# Patient Record
Sex: MALE | Race: WHITE | NOT HISPANIC OR LATINO | URBAN - METROPOLITAN AREA
[De-identification: names, ages, dates, MRNs, and addresses within clinical notes are randomized per-mention and may not be internally consistent; named-entity substitution may affect disease eponyms.]

---

## 2017-09-03 ENCOUNTER — INPATIENT (INPATIENT)
Facility: HOSPITAL | Age: 57
LOS: 3 days | DRG: 189 | End: 2017-09-07
Attending: GENERAL ACUTE CARE HOSPITAL | Admitting: STUDENT IN AN ORGANIZED HEALTH CARE EDUCATION/TRAINING PROGRAM
Payer: COMMERCIAL

## 2017-09-03 VITALS
DIASTOLIC BLOOD PRESSURE: 85 MMHG | TEMPERATURE: 98 F | RESPIRATION RATE: 26 BRPM | OXYGEN SATURATION: 88 % | HEART RATE: 126 BPM | SYSTOLIC BLOOD PRESSURE: 115 MMHG

## 2017-09-03 DIAGNOSIS — C80.1 MALIGNANT (PRIMARY) NEOPLASM, UNSPECIFIED: ICD-10-CM

## 2017-09-03 DIAGNOSIS — Z98.890 OTHER SPECIFIED POSTPROCEDURAL STATES: Chronic | ICD-10-CM

## 2017-09-03 DIAGNOSIS — Z90.49 ACQUIRED ABSENCE OF OTHER SPECIFIED PARTS OF DIGESTIVE TRACT: Chronic | ICD-10-CM

## 2017-09-03 LAB
APTT BLD: 29.6 SEC — SIGNIFICANT CHANGE UP (ref 27.5–37.4)
BASOPHILS # BLD AUTO: 0.1 K/UL — SIGNIFICANT CHANGE UP (ref 0–0.2)
BASOPHILS NFR BLD AUTO: 0.5 % — SIGNIFICANT CHANGE UP (ref 0–2)
CK MB BLD-MCNC: 2 % — SIGNIFICANT CHANGE UP (ref 0–3.5)
CK MB CFR SERPL CALC: 3 NG/ML — SIGNIFICANT CHANGE UP (ref 0–6.7)
CK SERPL-CCNC: 150 U/L — SIGNIFICANT CHANGE UP (ref 30–200)
EOSINOPHIL # BLD AUTO: 0 K/UL — SIGNIFICANT CHANGE UP (ref 0–0.5)
EOSINOPHIL NFR BLD AUTO: 0.4 % — SIGNIFICANT CHANGE UP (ref 0–6)
GAS PNL BLDV: SIGNIFICANT CHANGE UP
HCT VFR BLD CALC: 37.9 % — LOW (ref 39–50)
HGB BLD-MCNC: 12.6 G/DL — LOW (ref 13–17)
INR BLD: 1.22 RATIO — HIGH (ref 0.88–1.16)
LYMPHOCYTES # BLD AUTO: 1.4 K/UL — SIGNIFICANT CHANGE UP (ref 1–3.3)
LYMPHOCYTES # BLD AUTO: 11.6 % — LOW (ref 13–44)
MCHC RBC-ENTMCNC: 33.2 GM/DL — SIGNIFICANT CHANGE UP (ref 32–36)
MCHC RBC-ENTMCNC: 33.7 PG — SIGNIFICANT CHANGE UP (ref 27–34)
MCV RBC AUTO: 101 FL — HIGH (ref 80–100)
MONOCYTES # BLD AUTO: 0.6 K/UL — SIGNIFICANT CHANGE UP (ref 0–0.9)
MONOCYTES NFR BLD AUTO: 5 % — SIGNIFICANT CHANGE UP (ref 2–14)
NEUTROPHILS # BLD AUTO: 10.3 K/UL — HIGH (ref 1.8–7.4)
NEUTROPHILS NFR BLD AUTO: 82.5 % — HIGH (ref 43–77)
NT-PROBNP SERPL-SCNC: 4160 PG/ML — HIGH (ref 0–300)
PLATELET # BLD AUTO: 59 K/UL — LOW (ref 150–400)
PROTHROM AB SERPL-ACNC: 13.3 SEC — HIGH (ref 9.8–12.7)
RAPID RVP RESULT: SIGNIFICANT CHANGE UP
RBC # BLD: 3.75 M/UL — LOW (ref 4.2–5.8)
RBC # FLD: 17.1 % — HIGH (ref 10.3–14.5)
TROPONIN T SERPL-MCNC: 0.01 NG/ML — SIGNIFICANT CHANGE UP (ref 0–0.06)
WBC # BLD: 12.4 K/UL — HIGH (ref 3.8–10.5)
WBC # FLD AUTO: 12.4 K/UL — HIGH (ref 3.8–10.5)

## 2017-09-03 PROCEDURE — 99291 CRITICAL CARE FIRST HOUR: CPT | Mod: 25

## 2017-09-03 PROCEDURE — 71275 CT ANGIOGRAPHY CHEST: CPT | Mod: 26

## 2017-09-03 PROCEDURE — 93010 ELECTROCARDIOGRAM REPORT: CPT

## 2017-09-03 PROCEDURE — 71010: CPT | Mod: 26

## 2017-09-03 RX ORDER — IPRATROPIUM/ALBUTEROL SULFATE 18-103MCG
3 AEROSOL WITH ADAPTER (GRAM) INHALATION ONCE
Qty: 0 | Refills: 0 | Status: COMPLETED | OUTPATIENT
Start: 2017-09-03 | End: 2017-09-03

## 2017-09-03 RX ADMIN — Medication 3 MILLILITER(S): at 21:41

## 2017-09-03 NOTE — ED PROVIDER NOTE - ATTENDING CONTRIBUTION TO CARE
Pt with worsening dyspnea, low sats, h/o PE on Lovenox.  Exam: bilateral bs, good aeration on bipap.

## 2017-09-03 NOTE — ED PROVIDER NOTE - NS ED ROS FT
Padmaja Cotton, DO: +weakness, constipation ROS: denies chest pain, abdominal pain, diarrhea, joint pain, neuro deficits, dysuria/hematuria, rash.

## 2017-09-03 NOTE — ED PROVIDER NOTE - PROGRESS NOTE DETAILS
Padmaja Cotton, DO: Pt less tachypenic & symptomatically improved on BIPAP. Coloring improved although still belly breathing. Will remain on BIPAP at this time.

## 2017-09-03 NOTE — ED PROVIDER NOTE - CRITICAL CARE PROVIDED
direct patient care (not related to procedure)/documentation/consult w/ pt's family directly relating to pts condition/interpretation of diagnostic studies/additional history taking

## 2017-09-03 NOTE — ED ADULT NURSE NOTE - OBJECTIVE STATEMENT
58 yo male with PMH advanced lung cancer, PE brought by EMS with difficulty breathing. Patient states that he has been short of breath x1 week, symptoms worsened acutely today. Upon arrival to ED, patient O2 sat 84% on 6L NC, patient tachycardic and tachypneic. Symptoms improved with nonrebreather mask 15L. Abdomen is mildly distended, bruised 2/2 lovenox injections. +Bilateral lower extremity edema. Family at bedside.

## 2017-09-03 NOTE — ED PROVIDER NOTE - OBJECTIVE STATEMENT
Padmaja Cotton, DO: 57M with hx of stage IV lung CA on immunotherapy (Nic). Pt diagnosed Nov 1, 2016, previously on chemo. Hx of PE with failed therapy anticoagulants, now on Lovenox 120 mg BID.. Pt with SOB x 1 week, progressively worsening. Pt saw your Hem/Onc on Tuesday who noted b/l LE edema likely 2/2 4 mg TID. Per wife, Pt had fever 100.2 yesterday & has had hemoptysis but has had epistaxis.     PMD: Jeanmarie Shaffer ([facility])  Hem/Onc: Dr. Brien Avelar ([facility])  Pulm: Marcio Scott ([facility]) Padmaja Cotton, DO: 57M with hx of stage IV lung CA on immunotherapy (Nic). Pt diagnosed Nov 1, 2016, previously on chemo. Hx of PE with failed therapy anticoagulants, now on Lovenox 120 mg BID.. Pt with SOB x 1 week, progressively worsening. Pt saw your Hem/Onc on Tuesday who noted b/l LE edema likely 2/2 4 mg TID. Per wife, Pt had fever 100.2 yesterday & has had hemoptysis but has had epistaxis. Abdominal ecchymosis 2/2 lovenox with no recent trauma.     PMD: Jeanmarie Shaffer ([facility])  Hem/Onc: Dr. Brien Avelar ([facility])  Pulm: Marcio Scott ([facility])

## 2017-09-03 NOTE — ED PROVIDER NOTE - PHYSICAL EXAMINATION
Padmaja Cotton, DO:  Gen: tachypneic, hypoxic,Well appearing, NAD  Head: NCAT  HEENT: PERRL, MMM, normal conjunctiva, anicteric, neck supple  Lung: CTAB, no adventitious sounds  CV: RRR, no murmurs, rubs or gallops  Abd: soft, NTND, no rebound or guarding, no CVAT  MSK: No edema, no visible deformities  Neuro: No focal neurologic deficits. CN II-XII grossly intact. 5/5 strength and normal sensation in all extremities.  Skin: Warm and dry, no evidence of rash  Psych: normal mood and affect Padmaja Cotton, DO:  Gen: tachypneic, hypoxic to 76% on RA, accessory muscle use & belly breathing   Head: NCAT  HEENT: PERRL, MMM, normal conjunctiva, anicteric, neck supple, dried blood on b/l nasal mucosa, airway patent  Lung: CTAB, no adventitious sounds, no stridor  CV: tachycardic with regular rate, no murmurs, rubs or gallops  Abd: soft, obese NTND, no rebound or guarding, no CVAT, flank ecchymosis   MSK: No edema, no visible deformities  Neuro: No focal neurologic deficits. CN II-XII grossly intact. 5/5 strength and normal sensation in all extremities.  Skin: Warm and dry, no evidence of rash  Psych: normal mood and affect

## 2017-09-04 DIAGNOSIS — D69.6 THROMBOCYTOPENIA, UNSPECIFIED: ICD-10-CM

## 2017-09-04 DIAGNOSIS — I27.82 CHRONIC PULMONARY EMBOLISM: ICD-10-CM

## 2017-09-04 DIAGNOSIS — C34.90 MALIGNANT NEOPLASM OF UNSPECIFIED PART OF UNSPECIFIED BRONCHUS OR LUNG: ICD-10-CM

## 2017-09-04 DIAGNOSIS — J96.01 ACUTE RESPIRATORY FAILURE WITH HYPOXIA: ICD-10-CM

## 2017-09-04 LAB
ANION GAP SERPL CALC-SCNC: 18 MMOL/L — HIGH (ref 5–17)
ANISOCYTOSIS BLD QL: SLIGHT — SIGNIFICANT CHANGE UP
APPEARANCE UR: CLEAR — SIGNIFICANT CHANGE UP
APTT BLD: 29.1 SEC — SIGNIFICANT CHANGE UP (ref 27.5–37.4)
BACTERIA # UR AUTO: NEGATIVE — SIGNIFICANT CHANGE UP
BASOPHILS # BLD AUTO: 0.08 K/UL — SIGNIFICANT CHANGE UP (ref 0–0.2)
BASOPHILS NFR BLD AUTO: 1 % — SIGNIFICANT CHANGE UP (ref 0–2)
BILIRUB UR-MCNC: NEGATIVE — SIGNIFICANT CHANGE UP
BUN SERPL-MCNC: 39 MG/DL — HIGH (ref 7–23)
CALCIUM SERPL-MCNC: 7.9 MG/DL — LOW (ref 8.4–10.5)
CHLORIDE SERPL-SCNC: 105 MMOL/L — SIGNIFICANT CHANGE UP (ref 96–108)
CK MB BLD-MCNC: 2.1 % — SIGNIFICANT CHANGE UP (ref 0–3.5)
CK MB BLD-MCNC: 2.1 % — SIGNIFICANT CHANGE UP (ref 0–3.5)
CK MB CFR SERPL CALC: 2.6 NG/ML — SIGNIFICANT CHANGE UP (ref 0–6.7)
CK MB CFR SERPL CALC: 2.9 NG/ML — SIGNIFICANT CHANGE UP (ref 0–6.7)
CK SERPL-CCNC: 122 U/L — SIGNIFICANT CHANGE UP (ref 30–200)
CK SERPL-CCNC: 136 U/L — SIGNIFICANT CHANGE UP (ref 30–200)
CO2 SERPL-SCNC: 18 MMOL/L — LOW (ref 22–31)
COLOR SPEC: YELLOW — SIGNIFICANT CHANGE UP
CREAT SERPL-MCNC: 1.15 MG/DL — SIGNIFICANT CHANGE UP (ref 0.5–1.3)
DIFF PNL FLD: ABNORMAL
EOSINOPHIL # BLD AUTO: 0.08 K/UL — SIGNIFICANT CHANGE UP (ref 0–0.5)
EOSINOPHIL NFR BLD AUTO: 1 % — SIGNIFICANT CHANGE UP (ref 0–6)
EPI CELLS # UR: 3 /HPF — SIGNIFICANT CHANGE UP (ref 0–5)
GAS PNL BLDV: SIGNIFICANT CHANGE UP
GLUCOSE SERPL-MCNC: 98 MG/DL — SIGNIFICANT CHANGE UP (ref 70–99)
GLUCOSE UR QL: NEGATIVE — SIGNIFICANT CHANGE UP
HCT VFR BLD CALC: 33.6 % — LOW (ref 39–50)
HCT VFR BLD CALC: 35.7 % — LOW (ref 39–50)
HGB BLD-MCNC: 10.9 G/DL — LOW (ref 13–17)
HGB BLD-MCNC: 11.8 G/DL — LOW (ref 13–17)
HYALINE CASTS # UR AUTO: 8 /LPF — HIGH (ref 0–7)
INR BLD: 1.19 RATIO — HIGH (ref 0.88–1.16)
KETONES UR-MCNC: NEGATIVE — SIGNIFICANT CHANGE UP
LACTATE SERPL-SCNC: 1.5 MMOL/L — SIGNIFICANT CHANGE UP (ref 0.7–2)
LEUKOCYTE ESTERASE UR-ACNC: NEGATIVE — SIGNIFICANT CHANGE UP
LYMPHOCYTES # BLD AUTO: 1.4 K/UL — SIGNIFICANT CHANGE UP (ref 1–3.3)
LYMPHOCYTES # BLD AUTO: 17 % — SIGNIFICANT CHANGE UP (ref 13–44)
MANUAL SMEAR VERIFICATION: SIGNIFICANT CHANGE UP
MCHC RBC-ENTMCNC: 32.2 PG — SIGNIFICANT CHANGE UP (ref 27–34)
MCHC RBC-ENTMCNC: 32.4 GM/DL — SIGNIFICANT CHANGE UP (ref 32–36)
MCHC RBC-ENTMCNC: 33 GM/DL — SIGNIFICANT CHANGE UP (ref 32–36)
MCHC RBC-ENTMCNC: 33.6 PG — SIGNIFICANT CHANGE UP (ref 27–34)
MCV RBC AUTO: 102 FL — HIGH (ref 80–100)
MCV RBC AUTO: 99.4 FL — SIGNIFICANT CHANGE UP (ref 80–100)
METAMYELOCYTES # FLD: 4 % — HIGH (ref 0–0)
MONOCYTES # BLD AUTO: 0.25 K/UL — SIGNIFICANT CHANGE UP (ref 0–0.9)
MONOCYTES NFR BLD AUTO: 3 % — SIGNIFICANT CHANGE UP (ref 2–14)
MYELOCYTES NFR BLD: 2 % — HIGH (ref 0–0)
NEUTROPHILS # BLD AUTO: 5.93 K/UL — SIGNIFICANT CHANGE UP (ref 1.8–7.4)
NEUTROPHILS NFR BLD AUTO: 69 % — SIGNIFICANT CHANGE UP (ref 43–77)
NEUTS BAND # BLD: 3 % — SIGNIFICANT CHANGE UP (ref 0–8)
NITRITE UR-MCNC: NEGATIVE — SIGNIFICANT CHANGE UP
NRBC # BLD: 2 /100 — HIGH (ref 0–0)
PH UR: 6 — SIGNIFICANT CHANGE UP (ref 5–8)
PLAT MORPH BLD: NORMAL — SIGNIFICANT CHANGE UP
PLATELET # BLD AUTO: 46 K/UL — LOW (ref 150–400)
PLATELET # BLD AUTO: 56 K/UL — LOW (ref 150–400)
POTASSIUM SERPL-MCNC: 4.8 MMOL/L — SIGNIFICANT CHANGE UP (ref 3.5–5.3)
POTASSIUM SERPL-SCNC: 4.8 MMOL/L — SIGNIFICANT CHANGE UP (ref 3.5–5.3)
PROT UR-MCNC: 100
PROTHROM AB SERPL-ACNC: 13.5 SEC — HIGH (ref 10–13.1)
RBC # BLD: 3.38 M/UL — LOW (ref 4.2–5.8)
RBC # BLD: 3.5 M/UL — LOW (ref 4.2–5.8)
RBC # FLD: 17 % — HIGH (ref 10.3–14.5)
RBC # FLD: 18.8 % — HIGH (ref 10.3–14.5)
RBC BLD AUTO: SIGNIFICANT CHANGE UP
RBC CASTS # UR COMP ASSIST: 4 /HPF — SIGNIFICANT CHANGE UP (ref 0–4)
SODIUM SERPL-SCNC: 141 MMOL/L — SIGNIFICANT CHANGE UP (ref 135–145)
SP GR SPEC: =1.051 — SIGNIFICANT CHANGE UP (ref 1.01–1.02)
TROPONIN T SERPL-MCNC: <0.01 NG/ML — SIGNIFICANT CHANGE UP (ref 0–0.06)
TROPONIN T SERPL-MCNC: <0.01 NG/ML — SIGNIFICANT CHANGE UP (ref 0–0.06)
UROBILINOGEN FLD QL: NEGATIVE — SIGNIFICANT CHANGE UP
WBC # BLD: 10.1 K/UL — SIGNIFICANT CHANGE UP (ref 3.8–10.5)
WBC # BLD: 8.24 K/UL — SIGNIFICANT CHANGE UP (ref 3.8–10.5)
WBC # FLD AUTO: 10.1 K/UL — SIGNIFICANT CHANGE UP (ref 3.8–10.5)
WBC # FLD AUTO: 8.24 K/UL — SIGNIFICANT CHANGE UP (ref 3.8–10.5)
WBC UR QL: 5 /HPF — SIGNIFICANT CHANGE UP (ref 0–5)

## 2017-09-04 PROCEDURE — 99223 1ST HOSP IP/OBS HIGH 75: CPT

## 2017-09-04 RX ORDER — GLUCAGON INJECTION, SOLUTION 0.5 MG/.1ML
1 INJECTION, SOLUTION SUBCUTANEOUS ONCE
Qty: 0 | Refills: 0 | Status: DISCONTINUED | OUTPATIENT
Start: 2017-09-04 | End: 2017-09-07

## 2017-09-04 RX ORDER — VANCOMYCIN HCL 1 G
VIAL (EA) INTRAVENOUS
Qty: 0 | Refills: 0 | Status: DISCONTINUED | OUTPATIENT
Start: 2017-09-04 | End: 2017-09-05

## 2017-09-04 RX ORDER — DEXTROSE 50 % IN WATER 50 %
25 SYRINGE (ML) INTRAVENOUS ONCE
Qty: 0 | Refills: 0 | Status: DISCONTINUED | OUTPATIENT
Start: 2017-09-04 | End: 2017-09-07

## 2017-09-04 RX ORDER — PIPERACILLIN AND TAZOBACTAM 4; .5 G/20ML; G/20ML
3.38 INJECTION, POWDER, LYOPHILIZED, FOR SOLUTION INTRAVENOUS ONCE
Qty: 0 | Refills: 0 | Status: COMPLETED | OUTPATIENT
Start: 2017-09-04 | End: 2017-09-04

## 2017-09-04 RX ORDER — VANCOMYCIN HCL 1 G
1000 VIAL (EA) INTRAVENOUS ONCE
Qty: 0 | Refills: 0 | Status: COMPLETED | OUTPATIENT
Start: 2017-09-04 | End: 2017-09-04

## 2017-09-04 RX ORDER — VANCOMYCIN HCL 1 G
1000 VIAL (EA) INTRAVENOUS EVERY 12 HOURS
Qty: 0 | Refills: 0 | Status: DISCONTINUED | OUTPATIENT
Start: 2017-09-04 | End: 2017-09-05

## 2017-09-04 RX ORDER — ENOXAPARIN SODIUM 100 MG/ML
120 INJECTION SUBCUTANEOUS EVERY 12 HOURS
Qty: 0 | Refills: 0 | Status: DISCONTINUED | OUTPATIENT
Start: 2017-09-04 | End: 2017-09-07

## 2017-09-04 RX ORDER — DEXTROSE 50 % IN WATER 50 %
1 SYRINGE (ML) INTRAVENOUS ONCE
Qty: 0 | Refills: 0 | Status: DISCONTINUED | OUTPATIENT
Start: 2017-09-04 | End: 2017-09-07

## 2017-09-04 RX ORDER — FUROSEMIDE 40 MG
20 TABLET ORAL ONCE
Qty: 0 | Refills: 0 | Status: COMPLETED | OUTPATIENT
Start: 2017-09-04 | End: 2017-09-04

## 2017-09-04 RX ORDER — SODIUM CHLORIDE 9 MG/ML
1000 INJECTION, SOLUTION INTRAVENOUS
Qty: 0 | Refills: 0 | Status: DISCONTINUED | OUTPATIENT
Start: 2017-09-04 | End: 2017-09-07

## 2017-09-04 RX ORDER — DEXTROSE 50 % IN WATER 50 %
12.5 SYRINGE (ML) INTRAVENOUS ONCE
Qty: 0 | Refills: 0 | Status: DISCONTINUED | OUTPATIENT
Start: 2017-09-04 | End: 2017-09-07

## 2017-09-04 RX ORDER — SODIUM CHLORIDE 9 MG/ML
1000 INJECTION INTRAMUSCULAR; INTRAVENOUS; SUBCUTANEOUS
Qty: 0 | Refills: 0 | Status: DISCONTINUED | OUTPATIENT
Start: 2017-09-04 | End: 2017-09-04

## 2017-09-04 RX ORDER — AZITHROMYCIN 500 MG/1
500 TABLET, FILM COATED ORAL ONCE
Qty: 0 | Refills: 0 | Status: DISCONTINUED | OUTPATIENT
Start: 2017-09-04 | End: 2017-09-04

## 2017-09-04 RX ORDER — OXYCODONE AND ACETAMINOPHEN 5; 325 MG/1; MG/1
2 TABLET ORAL EVERY 4 HOURS
Qty: 0 | Refills: 0 | Status: DISCONTINUED | OUTPATIENT
Start: 2017-09-04 | End: 2017-09-07

## 2017-09-04 RX ORDER — PIPERACILLIN AND TAZOBACTAM 4; .5 G/20ML; G/20ML
3.38 INJECTION, POWDER, LYOPHILIZED, FOR SOLUTION INTRAVENOUS EVERY 8 HOURS
Qty: 0 | Refills: 0 | Status: DISCONTINUED | OUTPATIENT
Start: 2017-09-04 | End: 2017-09-07

## 2017-09-04 RX ORDER — CEFTRIAXONE 500 MG/1
1 INJECTION, POWDER, FOR SOLUTION INTRAMUSCULAR; INTRAVENOUS ONCE
Qty: 0 | Refills: 0 | Status: COMPLETED | OUTPATIENT
Start: 2017-09-04 | End: 2017-09-04

## 2017-09-04 RX ORDER — INSULIN LISPRO 100/ML
VIAL (ML) SUBCUTANEOUS EVERY 6 HOURS
Qty: 0 | Refills: 0 | Status: DISCONTINUED | OUTPATIENT
Start: 2017-09-04 | End: 2017-09-07

## 2017-09-04 RX ORDER — IPRATROPIUM/ALBUTEROL SULFATE 18-103MCG
3 AEROSOL WITH ADAPTER (GRAM) INHALATION EVERY 4 HOURS
Qty: 0 | Refills: 0 | Status: DISCONTINUED | OUTPATIENT
Start: 2017-09-04 | End: 2017-09-04

## 2017-09-04 RX ORDER — DEXAMETHASONE 0.5 MG/5ML
4 ELIXIR ORAL THREE TIMES A DAY
Qty: 0 | Refills: 0 | Status: DISCONTINUED | OUTPATIENT
Start: 2017-09-04 | End: 2017-09-04

## 2017-09-04 RX ADMIN — PIPERACILLIN AND TAZOBACTAM 25 GRAM(S): 4; .5 INJECTION, POWDER, LYOPHILIZED, FOR SOLUTION INTRAVENOUS at 11:05

## 2017-09-04 RX ADMIN — OXYCODONE AND ACETAMINOPHEN 2 TABLET(S): 5; 325 TABLET ORAL at 20:43

## 2017-09-04 RX ADMIN — Medication 4 MILLIGRAM(S): at 14:38

## 2017-09-04 RX ADMIN — OXYCODONE AND ACETAMINOPHEN 2 TABLET(S): 5; 325 TABLET ORAL at 03:43

## 2017-09-04 RX ADMIN — Medication 20 MILLIGRAM(S): at 20:43

## 2017-09-04 RX ADMIN — OXYCODONE AND ACETAMINOPHEN 2 TABLET(S): 5; 325 TABLET ORAL at 08:05

## 2017-09-04 RX ADMIN — Medication 250 MILLIGRAM(S): at 17:01

## 2017-09-04 RX ADMIN — Medication 425 MILLIGRAM(S): at 22:10

## 2017-09-04 RX ADMIN — OXYCODONE AND ACETAMINOPHEN 2 TABLET(S): 5; 325 TABLET ORAL at 07:52

## 2017-09-04 RX ADMIN — Medication 4 MILLIGRAM(S): at 06:08

## 2017-09-04 RX ADMIN — OXYCODONE AND ACETAMINOPHEN 2 TABLET(S): 5; 325 TABLET ORAL at 04:41

## 2017-09-04 RX ADMIN — Medication 3 MILLILITER(S): at 17:05

## 2017-09-04 RX ADMIN — Medication 40 MILLIGRAM(S): at 22:10

## 2017-09-04 RX ADMIN — CEFTRIAXONE 100 GRAM(S): 500 INJECTION, POWDER, FOR SOLUTION INTRAMUSCULAR; INTRAVENOUS at 01:17

## 2017-09-04 RX ADMIN — PIPERACILLIN AND TAZOBACTAM 200 GRAM(S): 4; .5 INJECTION, POWDER, LYOPHILIZED, FOR SOLUTION INTRAVENOUS at 03:25

## 2017-09-04 RX ADMIN — PIPERACILLIN AND TAZOBACTAM 25 GRAM(S): 4; .5 INJECTION, POWDER, LYOPHILIZED, FOR SOLUTION INTRAVENOUS at 18:34

## 2017-09-04 RX ADMIN — ENOXAPARIN SODIUM 120 MILLIGRAM(S): 100 INJECTION SUBCUTANEOUS at 06:07

## 2017-09-04 RX ADMIN — Medication 3 MILLILITER(S): at 05:43

## 2017-09-04 RX ADMIN — OXYCODONE AND ACETAMINOPHEN 2 TABLET(S): 5; 325 TABLET ORAL at 21:40

## 2017-09-04 RX ADMIN — SODIUM CHLORIDE 80 MILLILITER(S): 9 INJECTION INTRAMUSCULAR; INTRAVENOUS; SUBCUTANEOUS at 06:08

## 2017-09-04 RX ADMIN — ENOXAPARIN SODIUM 120 MILLIGRAM(S): 100 INJECTION SUBCUTANEOUS at 17:05

## 2017-09-04 RX ADMIN — Medication 3 MILLILITER(S): at 11:05

## 2017-09-04 RX ADMIN — Medication 250 MILLIGRAM(S): at 04:27

## 2017-09-04 NOTE — ED ADULT NURSE REASSESSMENT NOTE - NS ED NURSE REASSESS COMMENT FT1
Respiratory at bedside. pt placed on BiPAP with IPAP/EPAP:10/5 on 100% O2. Pt tolerating well.
As per Dr. Bell and Dr. Cotton, take pt off BiPAP and place pt on nonrebreather for CTA. Pt to CT now.
Dr. Cotton aware of pt's lovenox and dexamethasone medication regimen.
Dr. Giang educated pt on BIPAP. Pt agreed to be placed back on BIPAP.
Pt is in no current distress. Comfort and safety provided.
Pt returned from CT. Pt refused to be placed back on BIPAP machine. Dr. Cotton aware and at bedside.
Pt tolerating well on BiPAP. Pt is in no current distress. Comfort and safety provided.
Ecchymosis noted on abdomen - pt states from lovenox shots. Pt reports feeling better with BIPAP on. Pt is in no current distress. Comfort and safety provided.

## 2017-09-04 NOTE — H&P ADULT - NSHPLABSRESULTS_GEN_ALL_CORE
WBC 12.4.  Hgb 126. Platelets of 59.  INR 1.2.  Troponin nil x 2.  EKG tracing reviewed with sinus tachycardia at 121.  K+ 5.8>>repeated 4.8.  Random glucose of 98.  Cr 1.1  HCO3 18. WBC 12.4.  Hgb 126. Platelets of 59.  INR 1.2.  Troponin nil x 2.  EKG tracing reviewed with sinus tachycardia at 121.  K+ 5.8>>repeated 4.8.  Random glucose of 98.  Cr 1.1  HCO3 18.  Alb 3.9. WBC 12.4.  Hgb 126. Platelets of 59.  INR 1.2.  Troponin nil x 2.  EKG tracing reviewed with sinus tachycardia at 121.  K+ 5.8>>repeated 4.8.  Random glucose of 98.  Cr 1.1  HCO3 18.  Alb 3.9.  Chest radiograph reviewed with bibasilar increased markings consistent with pneumonia and patient's lung masses.  CTT angiogram with decreased of prior PE--NO central PE.  RIGHT hilar and upper lobe mass with patient's prior CA and bibasilar ground glass opacities.  Increased osseous mets since Dec 2016.

## 2017-09-04 NOTE — H&P ADULT - ASSESSMENT
NIGHT HOSPITALIST:  Presentation of patient with acute respiratory failure in the setting of metastatic lung CA with failed prior chemotherapy, now on immune modulators, with recurrent PE with prior breakthrough on Pradaxa and only improvement on Lovenox>>reviewed with patient that despite mild thrombocytopenia will continue with Lovenox for now>>as the risk of worsening of patient's known PE would be catastrophic>>mild epistaxis resolved with no present need for anterior packing.  Will follow platelets and patient should be evaluated by his oncologist later this AM.  Tolerating BiPAP and will assume aspiration risk for now while on BiPAP>>can reevaluate later this AM if patient can tolerate weaning from BiPAP.  Rocephin and Zithromax would be inadequate coverage>>will broaden to IV Zosyn and IV Vancomycin given patient's immune suppressed status.  Will continue with patient's Decadron to avoid relative adrenal insufficiency and for patient's bronchospasm in the setting of HCAP - lung CA.  Will upgrade to telemetry in the context of complicated bronchopneumonia and PE.  Prognosis is guarded and patient's long term prognosis is poor.  Emotional support provided to patient.  Patient is presently not yet ready to discuss advance directives nor goals of care.

## 2017-09-04 NOTE — CONSULT NOTE ADULT - ASSESSMENT
ACute hypoxemic respiratory failure with new bilateral ground glass opacities in patient with non small cell CA stage 4, currently on chronic decadron and recent Keytruda. Bilateral ground glass is c/w PCP (patient ;high risk) and/or Keytruda toxicity with capillary leak. Favor PCP. Patient has peristant yet diminished pulmonary emboli on outpatient Lovenox. He is a former heavy smoker; however has not been treated for COPD as an outpatient    REC:    Taper FIO2 to sat ot 90%; continue on BIPAP 10/5: transition to nasal or high flow as tolerated  MICU evaluation  Contunue empiric antibiotics; add Bactrim for possible PCP  Legionella Ag, PCR, fungitel  O>I with lasix as tolerated  Solumedrol 40 mg IV q8  ID evaluation called

## 2017-09-04 NOTE — H&P ADULT - ATTENDING COMMENTS
NIGHT HOSPITALIST:  Patient/ family aware of course and agree with plan/care as above.  Prognosis is guarded and patient's long term prognosis is poor.  Emotional support provided to patient.  Care reviewed with covering NP.  Care assumed by the DAY HOSPITALIST.

## 2017-09-04 NOTE — H&P ADULT - PROBLEM SELECTOR PLAN 3
Will continue with IV Lovenox for now despite mild thrombocytopenia given past failure to non Vitamin K antagonist but response to Lovenox.

## 2017-09-04 NOTE — H&P ADULT - NSHPSOURCEINFOTX_GEN_ALL_CORE
Reviewed medication list with patient.  Patient presently does not wish for examiner to contact family.

## 2017-09-04 NOTE — H&P ADULT - PROBLEM SELECTOR PLAN 2
See above.  Stage 4.  Would contact patient's oncologist of patient's admission.  Will continue with Decadron.  Duoneb around the clock.

## 2017-09-04 NOTE — H&P ADULT - NSHPSOCIALHISTORY_GEN_ALL_CORE
lives with spouse.  Former worker of [employer].  Quit tobacco in 2014.  No ethanol.

## 2017-09-04 NOTE — H&P ADULT - HISTORY OF PRESENT ILLNESS
NIGHT HOSPITALIST:  Patient UNKNOWN to me previously, assigned to me at this point via the ER and by Dr. Petit to admit this 58 y/o M--patient followed by Dr. Shaffer and Dr. Avelar at [facility]--patient with a history of non small cell lung CA diagnosed in Nov of last year with chemotherapy to December with former heavy tobacco smoker, with work up with stage 4 lung CA with bone mets, with patient noted in workup with pulmonary emboli previously on Pradaxa but with patient transitioned to Lovenox following suspected breakthrough of PE on Pradaxa, with the patient now on immunotherapy and Decadron with the patient with fever, productive cough for 4 days with scant epistaxis and was convinced by spouse to self refer to the ER.  Patient now on BiPAP in the ER with improvement of symptoms.  NO hemoptysis.  NO HA, no focal weakness.  No chest pain/pressure.  No abdominal pain, no red blood per rectum or melena.   No back pain.  No tearing  back pain.  NO hematuria.  Patient with anorexia and involuntary weight loss.  Remaining review of systems not contributory.

## 2017-09-04 NOTE — CONSULT NOTE ADULT - SUBJECTIVE AND OBJECTIVE BOX
PULMONARY CONSULT  Daniel Manriquez MD  [phone]      HPI: 57M with Non-small cell lung CA dx 12/16, currently on Keytruda, admits c/o dyspnea, chills, cough with clear spt over 4 days PTA. Patient on decardon 4mg bid for 8 months, increased to 4 mg qid for milder increase SOB 2-3 weeks PTA with suspicion of possible Keytruda pneumonitis. Per oncologist, outpt PET CT did not show sign infiltrates. Pt on lovenox after felt to have failed pradaxa for PE. Patient's decadron was reduced to 4 mg tid 1 week PTA due to failure to respond to h igher dose, and apparent absence of plneumonitis on outpt CT. In ER, patient was hypoxemic, dyspneic and started on BIPAP 10/5, vanco and azithromycin. CTA shows diminution of bilateral old PE's.     BRIEF HOSPITAL COURSE: ***    PAST MEDICAL & SURGICAL HISTORY:  Other chronic pulmonary embolism  Non-small cell carcinoma of lung: diagnosed 11/2016  Cancer: Lung cancer  No significant past surgical history    Allergies    No Known Allergies    Intolerances      FAMILY HISTORY:  No pertinent family history in first degree relatives    REVIEW OF SYSTEMS      General:	    Skin/Breast:  	  Ophthalmologic:  	  ENMT:	    Respiratory and Thorax:  	  Cardiovascular:	    Gastrointestinal:	    Genitourinary:	    Musculoskeletal:	    Neurological:	    Psychiatric:	    Hematology/Lymphatics:	    Endocrine:	    Allergic/Immunologic:	  Social history:       Medications:  MEDICATIONS  (STANDING):  dexamethasone     Tablet 4 milliGRAM(s) Oral three times a day  enoxaparin Injectable 120 milliGRAM(s) SubCutaneous every 12 hours  ALBUTerol/ipratropium for Nebulization 3 milliLiter(s) Nebulizer every 4 hours  piperacillin/tazobactam IVPB. 3.375 Gram(s) IV Intermittent every 8 hours  vancomycin  IVPB   IV Intermittent   insulin lispro (HumaLOG) corrective regimen sliding scale   SubCutaneous every 6 hours  dextrose 5%. 1000 milliLiter(s) (50 mL/Hr) IV Continuous <Continuous>  dextrose 50% Injectable 12.5 Gram(s) IV Push once  dextrose 50% Injectable 25 Gram(s) IV Push once  dextrose 50% Injectable 25 Gram(s) IV Push once  vancomycin  IVPB 1000 milliGRAM(s) IV Intermittent every 12 hours  sodium chloride 0.9%. 1000 milliLiter(s) (80 mL/Hr) IV Continuous <Continuous>    MEDICATIONS  (PRN):  oxyCODONE    5 mG/acetaminophen 325 mG 2 Tablet(s) Oral every 4 hours PRN Moderate Pain (4 - 6)  dextrose Gel 1 Dose(s) Oral once PRN Blood Glucose LESS THAN 70 milliGRAM(s)/deciliter  glucagon  Injectable 1 milliGRAM(s) IntraMuscular once PRN Glucose LESS THAN 70 milligrams/deciliter    Vital Signs Last 24 Hrs  T(C): 37 (04 Sep 2017 15:23), Max: 37 (04 Sep 2017 15:23)  T(F): 98.6 (04 Sep 2017 15:23), Max: 98.6 (04 Sep 2017 15:23)  HR: 102 (04 Sep 2017 15:23) (90 - 126)  BP: 114/78 (04 Sep 2017 15:23) (104/45 - 118/81)  BP(mean): --  RR: 20 (04 Sep 2017 15:23) (18 - 26)  SpO2: 100% (04 Sep 2017 15:23) (88% - 100%)          LABS:                        10.9   8.24  )-----------( 56       ( 04 Sep 2017 08:09 )             33.6     09-04    141  |  105  |  39<H>  ----------------------------<  98  4.8   |  18<L>  |  1.15    Ca    7.9<L>      04 Sep 2017 01:59    TPro  7.4  /  Alb  3.9  /  TBili  1.3<H>  /  DBili  x   /  AST  36  /  ALT  27  /  AlkPhos  222<H>  09-03      PT/INR - ( 04 Sep 2017 08:09 )   PT: 13.5 sec;   INR: 1.19 ratio         PTT - ( 04 Sep 2017 08:09 )  PTT:29.1 sec  Urinalysis Basic - ( 04 Sep 2017 09:49 )    Color: Yellow / Appearance: Clear / SG: =1.051 / pH: x  Gluc: x / Ketone: Negative  / Bili: Negative / Urobili: Negative   Blood: x / Protein: 100 / Nitrite: Negative   Leuk Esterase: Negative / RBC: 4 /HPF / WBC 5 /HPF   Sq Epi: x / Non Sq Epi: 3 /HPF / Bacteria: Negative        Serum Pro-Brain Natriuretic Peptide: 4160 pg/mL (09-03-17 @ 19:24)      CULTURES:        Physical Examination:    General:Anxious, non toxic, breathing comfortably on BIPAP 10/5 80% with oxygen sat 100%    HEENT: Pupils equal, reactive to light.  Symmetric.    PULM: Clear to auscultation bilaterally, without wheeze, crackles    CVS: Regular rate and rhythm, no murmurs, rubs, or gallops    ABD: Soft, nondistended, nontender, normoactive bowel sounds, no masses    EXT: No edema, nontender    SKIN: Warm and well perfused, no rashes noted.    NEURO: Alert, oriented, interactive, nonfocal    RADIOLOGY REVIEWED PERSONALLY  CXR:    CT chest: see above    MISTY 12/2016: positive bubble (PFO), otherwise normal LV/RV function. Normal valves

## 2017-09-04 NOTE — CONSULT NOTE ADULT - ASSESSMENT
57m with metastatic lung ca here with dyspnea, cough fever and chills  ct with increased bl opacities  given immunosuppression and long term decadron   broad differential  will cover broadly   and also for pcp

## 2017-09-04 NOTE — H&P ADULT - PROBLEM SELECTOR PLAN 1
See above.  Tolerating BiPAP>>can consider weaning this AM.  Will assume aspiration risk.  IV antibiotics broadened as above.  Upgraded to telemetry.

## 2017-09-04 NOTE — H&P ADULT - NSHPPHYSICALEXAM_GEN_ALL_CORE
Physical exam with an ill appearing, cachectic M.   Alert, oriented x 3 on BiPAP and able to converse in full sentences.   BP  104/50  HR  100-110.  RR 16.  Afebrile.  100% on BiPAP.  HEENT, PERRL< EOMI, no epistaxis noted.  Oropharynx clear.  Neck supple, chest with scattered rhonchi and polyphonic wheezes.  cor s1 s2 tachycardia.  Abdomen soft, normal bowel sounds, nontender,   Ext 2++ oedema B/L but intact skin.  NO crepitus.  Neurologic exam AxOx3.  Speech fluent.  Cognition intact.  UE/LE 5/5.  Gait not tested.

## 2017-09-04 NOTE — H&P ADULT - PMH
Cancer  Lung cancer  Non-small cell carcinoma of lung  diagnosed 11/2016  Other chronic pulmonary embolism

## 2017-09-04 NOTE — CONSULT NOTE ADULT - SUBJECTIVE AND OBJECTIVE BOX
[facility], Division of Infectious Diseases  AUDRA Vega A. Lee  [phone]    STEPHY OLIVARES  57y, Male  [id number]    HPI  56 y/o M- h/o lung cancer stage 4 dx 12/16with mets to bone, PE  on lovenox  following suspected breakthrough of PE on Pradaxa,      patient now on Keytruda and Decadron presents with dyspnea, fever, productive cough for 4 days with scant epistaxis and was convinced by spouse to self refer to the ER.   States he had some chills, and sore throat. No sick contacts.  Has a cat at home.    Last month was on augmentin because of his meds ?? prophylaxis.  Denies diarrhea, no travel  has a cat at home.   Patient now on BiPAP in the ER with improvement of symptoms.  NO hemoptysis.        PMH/PSH--  Other chronic pulmonary embolism  Non-small cell carcinoma of lung  appendectomy      Allergies--NKDA      Medications--  Antibiotics: piperacillin/tazobactam IVPB. 3.375 Gram(s) IV Intermittent every 8 hours  vancomycin  IVPB   IV Intermittent   vancomycin  IVPB 1000 milliGRAM(s) IV Intermittent every 12 hours    Immunologic:   Other: dexamethasone     Tablet  oxyCODONE    5 mG/acetaminophen 325 mG PRN  enoxaparin Injectable  ALBUTerol/ipratropium for Nebulization  insulin lispro (HumaLOG) corrective regimen sliding scale  dextrose 5%.  dextrose Gel PRN  dextrose 50% Injectable  dextrose 50% Injectable  dextrose 50% Injectable  glucagon  Injectable PRN  sodium chloride 0.9%.  methylPREDNISolone sodium succinate Injectable      Social History--  EtOH: denies ***  Tobacco*former  Drug Use: denies ***    Family/Marital History--  father leukemia  mother colon ca    Remainder not relevant to clincial concern.    Travel/Environmental/Occupational History:  [employer]  no recent travel    Review of Systems:  A >=10-point review of systems was obtained.     Pertinent positives and negatives--  Constitutional: + fevers. + Chills. No Rigors.   Eyes: no blurry vision  ENMT: ++ sore throat. ++ nose bleed  Cardiovascular: No chest pain. No palpitations.  Respiratory: ++shortness of breath. ++cough.  Gastrointestinal: No nausea or vomiting. No diarrhea or constipation.   Genitourinary: no dysuria  Musculoskeletal: no myalgia  Skin: no rash  Neurologic: no headache  Psychiatric: Pleasant. Appropriate affect.    Review of systems otherwise negative except as previously noted.    Physical Exam--  Vital Signs: T(F): 98.6 (09-04-17 @ 15:23), Max: 98.6 (09-04-17 @ 15:23)  HR: 102 (09-04-17 @ 15:23)  BP: 114/78 (09-04-17 @ 15:23)  RR: 20 (09-04-17 @ 15:23)  SpO2: 100% (09-04-17 @ 15:23)  Wt(kg): --  General: Nontoxic-appearing Male in no acute distress.  HEENT: AT/NC. . Anicteric. Conjunctiva pink and moist.++ fm  Neck: Not rigid. No sense of mass.  Nodes: None palpable.  Lungs: clear decreased bs left side  Heart: Regular rate and rhythm. No Murmur. No rub. No gallop. No palpable thrill.  Abdomen: Bowel sounds present and normoactive. Soft. Nondistended. ++ echymosis  Extremities: No cyanosis or clubbing. + edema.   Skin: Warm. Dry. Good turgor. No rash. No vasculitic stigmata.  Psychiatric: Appropriate affect and mood for situation.         Laboratory & Imaging Data--  CBC                        10.9   8.24  )-----------( 56       ( 04 Sep 2017 08:09 )             33.6       Chemistries  09-04    141  |  105  |  39<H>  ----------------------------<  98  4.8   |  18<L>  |  1.15    Ca    7.9<L>      04 Sep 2017 01:59    TPro  7.4  /  Alb  3.9  /  TBili  1.3<H>  /  DBili  x   /  AST  36  /  ALT  27  /  AlkPhos  222<H>  09-03  < from: CT Angio Chest w/ IV Cont (09.03.17 @ 20:34) >  EXAM:  CT ANGIO CHEST (W)AW IC                            PROCEDURE DATE:  09/03/2017            INTERPRETATION:  CLINICAL INFORMATION: Cough and difficulty breathing.   Stage IV lung cancer. History of pulmonary embolism on Lovenox. Evaluate   forpulmonary embolism.    COMPARISON: CT chest 12/19/2016.    PROCEDURE:   CTA of the Chest was performed with intravenous contrast.  90 ml of Omnipaque 350 was injected intravenously. 10 ml were discarded.  Sagittal and coronal reformats were performedas well as 3D   Reconstructions.    FINDINGS: The patient's respiratory motion degrading images.    LUNGS AND LARGE AIRWAYS: Patent central airways. Stable right   perihilar/right upper lobe mass measuring approximately 7.3 x 2.3 x 4.4   cm and surrounding right upper lobe vessels and narrowing the right   distal mainstem bronchus, right upper lobe apical segmental bronchi and   occluding the anterior and posterior segmental bronchi. Groundglass   opacities scattered in both lungs, significantly increased since prior   study. Multiple scattered bilateral pulmonary nodules again noted.  PLEURA: No pleural effusion.  VESSELS: Suboptimal evaluation of the peripheral pulmonary arteries due   to patient motion. Decreased extent of previously notedemboli in the   left interlobar artery, left upper lobe, left lower lobe and lingula,   right middle lobe and right lower lobe segmental pulmonary arteries with   residual emboli in the segmental pulmonary arteries of the left upper   lobe, bilaterallower lobes and possibly right upper lobe. No central   pulmonary embolus.  HEART: Normal heart size. No pericardial effusion.  MEDIASTINUM AND OZZIE: No lymphadenopathy.  CHEST WALL AND LOWER NECK: Mild gynecomastia.  VISUALIZED UPPER ABDOMEN: Diffuse fatty pancreatic atrophy. Nonspecific   bilateral perinephric stranding.  BONES: Diffuse osseous sclerotic metastases, progressed from prior exam.    IMPRESSION:     Suboptimal evaluation of the peripheral pulmonary arteries. Overall   decreased extent of previously noted emboli with residual segmental   pulmonary emboli as described. No central pulmonary embolus.    Persistent right hilar/right upper lobe mass, which is similar in   appearance to 12/19/2016 and is in keeping with patient's known   malignancy.    Significantly increased bilateral pulmonary groundglass opacities, which   may represent pneumonia although additional metastasis cannot be   excluded. Recommend follow-up to assess resolution following completion   of treatment.      < end of copied text >

## 2017-09-05 LAB
ANION GAP SERPL CALC-SCNC: 14 MMOL/L — SIGNIFICANT CHANGE UP (ref 5–17)
ANION GAP SERPL CALC-SCNC: 14 MMOL/L — SIGNIFICANT CHANGE UP (ref 5–17)
APTT BLD: 28.5 SEC — SIGNIFICANT CHANGE UP (ref 27.5–37.4)
BASE EXCESS BLDA CALC-SCNC: -6.3 MMOL/L — LOW (ref -2–2)
BASOPHILS # BLD AUTO: 0.01 K/UL — SIGNIFICANT CHANGE UP (ref 0–0.2)
BASOPHILS NFR BLD AUTO: 0.1 % — SIGNIFICANT CHANGE UP (ref 0–2)
BUN SERPL-MCNC: 30 MG/DL — HIGH (ref 7–23)
BUN SERPL-MCNC: 32 MG/DL — HIGH (ref 7–23)
CALCIUM SERPL-MCNC: 6.9 MG/DL — LOW (ref 8.4–10.5)
CALCIUM SERPL-MCNC: 7.6 MG/DL — LOW (ref 8.4–10.5)
CHLORIDE SERPL-SCNC: 106 MMOL/L — SIGNIFICANT CHANGE UP (ref 96–108)
CHLORIDE SERPL-SCNC: 106 MMOL/L — SIGNIFICANT CHANGE UP (ref 96–108)
CO2 BLDA-SCNC: 17 MMOL/L — LOW (ref 22–30)
CO2 SERPL-SCNC: 17 MMOL/L — LOW (ref 22–31)
CO2 SERPL-SCNC: 19 MMOL/L — LOW (ref 22–31)
CREAT SERPL-MCNC: 0.91 MG/DL — SIGNIFICANT CHANGE UP (ref 0.5–1.3)
CREAT SERPL-MCNC: 0.95 MG/DL — SIGNIFICANT CHANGE UP (ref 0.5–1.3)
EOSINOPHIL # BLD AUTO: 0.01 K/UL — SIGNIFICANT CHANGE UP (ref 0–0.5)
EOSINOPHIL NFR BLD AUTO: 0.1 % — SIGNIFICANT CHANGE UP (ref 0–6)
GAS PNL BLDA: SIGNIFICANT CHANGE UP
GLUCOSE SERPL-MCNC: 122 MG/DL — HIGH (ref 70–99)
GLUCOSE SERPL-MCNC: 136 MG/DL — HIGH (ref 70–99)
HCO3 BLDA-SCNC: 16 MMOL/L — LOW (ref 21–29)
HCT VFR BLD CALC: 32.7 % — LOW (ref 39–50)
HGB BLD-MCNC: 10.4 G/DL — LOW (ref 13–17)
HOROWITZ INDEX BLDA+IHG-RTO: 60 — SIGNIFICANT CHANGE UP
IMM GRANULOCYTES NFR BLD AUTO: 1.8 % — HIGH (ref 0–1.5)
INR BLD: 1.2 RATIO — HIGH (ref 0.88–1.16)
LEGIONELLA AG UR QL: NEGATIVE — SIGNIFICANT CHANGE UP
LYMPHOCYTES # BLD AUTO: 0.84 K/UL — LOW (ref 1–3.3)
LYMPHOCYTES # BLD AUTO: 9.6 % — LOW (ref 13–44)
MCHC RBC-ENTMCNC: 31.6 PG — SIGNIFICANT CHANGE UP (ref 27–34)
MCHC RBC-ENTMCNC: 31.8 GM/DL — LOW (ref 32–36)
MCV RBC AUTO: 99.4 FL — SIGNIFICANT CHANGE UP (ref 80–100)
MONOCYTES # BLD AUTO: 0.25 K/UL — SIGNIFICANT CHANGE UP (ref 0–0.9)
MONOCYTES NFR BLD AUTO: 2.9 % — SIGNIFICANT CHANGE UP (ref 2–14)
NEUTROPHILS # BLD AUTO: 7.47 K/UL — HIGH (ref 1.8–7.4)
NEUTROPHILS NFR BLD AUTO: 85.5 % — HIGH (ref 43–77)
PCO2 BLDA: 24 MMHG — LOW (ref 32–46)
PH BLDA: 7.45 — SIGNIFICANT CHANGE UP (ref 7.35–7.45)
PLATELET # BLD AUTO: 61 K/UL — LOW (ref 150–400)
PO2 BLDA: 59 MMHG — LOW (ref 74–108)
POTASSIUM SERPL-MCNC: 4.6 MMOL/L — SIGNIFICANT CHANGE UP (ref 3.5–5.3)
POTASSIUM SERPL-MCNC: 5.8 MMOL/L — HIGH (ref 3.5–5.3)
POTASSIUM SERPL-SCNC: 4.6 MMOL/L — SIGNIFICANT CHANGE UP (ref 3.5–5.3)
POTASSIUM SERPL-SCNC: 5.8 MMOL/L — HIGH (ref 3.5–5.3)
PROTHROM AB SERPL-ACNC: 13.6 SEC — HIGH (ref 10–13.1)
RAPID RVP RESULT: SIGNIFICANT CHANGE UP
RBC # BLD: 3.29 M/UL — LOW (ref 4.2–5.8)
RBC # FLD: 18.2 % — HIGH (ref 10.3–14.5)
SAO2 % BLDA: 90 % — LOW (ref 92–96)
SODIUM SERPL-SCNC: 137 MMOL/L — SIGNIFICANT CHANGE UP (ref 135–145)
SODIUM SERPL-SCNC: 139 MMOL/L — SIGNIFICANT CHANGE UP (ref 135–145)
VANCOMYCIN TROUGH SERPL-MCNC: 5.2 UG/ML — LOW (ref 10–20)
WBC # BLD: 8.74 K/UL — SIGNIFICANT CHANGE UP (ref 3.8–10.5)
WBC # FLD AUTO: 8.74 K/UL — SIGNIFICANT CHANGE UP (ref 3.8–10.5)

## 2017-09-05 PROCEDURE — 71010: CPT | Mod: 26

## 2017-09-05 RX ORDER — VANCOMYCIN HCL 1 G
1250 VIAL (EA) INTRAVENOUS EVERY 12 HOURS
Qty: 0 | Refills: 0 | Status: DISCONTINUED | OUTPATIENT
Start: 2017-09-05 | End: 2017-09-07

## 2017-09-05 RX ADMIN — Medication 166.67 MILLIGRAM(S): at 18:42

## 2017-09-05 RX ADMIN — Medication 200 MILLIGRAM(S): at 13:42

## 2017-09-05 RX ADMIN — ENOXAPARIN SODIUM 120 MILLIGRAM(S): 100 INJECTION SUBCUTANEOUS at 06:55

## 2017-09-05 RX ADMIN — PIPERACILLIN AND TAZOBACTAM 25 GRAM(S): 4; .5 INJECTION, POWDER, LYOPHILIZED, FOR SOLUTION INTRAVENOUS at 11:08

## 2017-09-05 RX ADMIN — Medication 425 MILLIGRAM(S): at 06:55

## 2017-09-05 RX ADMIN — OXYCODONE AND ACETAMINOPHEN 2 TABLET(S): 5; 325 TABLET ORAL at 15:00

## 2017-09-05 RX ADMIN — Medication 40 MILLIGRAM(S): at 13:42

## 2017-09-05 RX ADMIN — OXYCODONE AND ACETAMINOPHEN 2 TABLET(S): 5; 325 TABLET ORAL at 22:53

## 2017-09-05 RX ADMIN — Medication 425 MILLIGRAM(S): at 23:17

## 2017-09-05 RX ADMIN — Medication 40 MILLIGRAM(S): at 17:15

## 2017-09-05 RX ADMIN — PIPERACILLIN AND TAZOBACTAM 25 GRAM(S): 4; .5 INJECTION, POWDER, LYOPHILIZED, FOR SOLUTION INTRAVENOUS at 02:33

## 2017-09-05 RX ADMIN — PIPERACILLIN AND TAZOBACTAM 25 GRAM(S): 4; .5 INJECTION, POWDER, LYOPHILIZED, FOR SOLUTION INTRAVENOUS at 21:06

## 2017-09-05 RX ADMIN — Medication 425 MILLIGRAM(S): at 14:22

## 2017-09-05 RX ADMIN — ENOXAPARIN SODIUM 120 MILLIGRAM(S): 100 INJECTION SUBCUTANEOUS at 17:15

## 2017-09-05 RX ADMIN — Medication 40 MILLIGRAM(S): at 06:55

## 2017-09-05 RX ADMIN — OXYCODONE AND ACETAMINOPHEN 2 TABLET(S): 5; 325 TABLET ORAL at 14:31

## 2017-09-05 RX ADMIN — Medication 250 MILLIGRAM(S): at 05:23

## 2017-09-05 NOTE — CHART NOTE - NSCHARTNOTEFT_GEN_A_CORE
ABG - ( 05 Sep 2017 09:27 ) - reviewed   pH: 7.45  /  pCO2: 24    /  pO2: 59    / HCO3: 16    / Base Excess: -6.3  /  SaO2: 90     Patient continues to have increased work of breathing - respiratory rate 24 - 26/mt    Increased FiO2 to 80% and BIPAP setting changed to 10/5  Continuous Pulse oxymetry     Estrella Israel NP   [id number]

## 2017-09-05 NOTE — CHART NOTE - NSCHARTNOTEFT_GEN_A_CORE
Patient complaining of severe discomfort on BIPAP  Tried High flow Oxygen and patient reporting respiratory ease and comfort RR 22- 24/mt  Continuous pulse oxymetry - 93 - 94% on 100% FiO2 - Will monitor response and attempt to wean O2    Estrella Israel NP  [id number]

## 2017-09-05 NOTE — CHART NOTE - NSCHARTNOTEFT_GEN_A_CORE
Vanco trough 5.2  Vancomycin increased to 1250mg [id number]  Check Vanco trough pre 4th dose    Estrella Israel NP  [id number]

## 2017-09-05 NOTE — PROGRESS NOTE ADULT - ASSESSMENT
Acute hypoxemic respiratory failure secondary to chemo toxicity vs. infection (?PCP) - favor PCP  Non small cell lung CA stage 4  Recurrent VTE with decreasing yet persit bilaeral PE's, on lovenox  Smoking history with probable underlying COPD, though had not been treated by outpt pulm for latter - PFT's may have been nl    REC:    Continue solumedrol and abx  Repeat CXR today  Increase solumedrol to 40 qq 8 - further dosing per cxr and clinical status  Taper FIO2 on high flow: target sat 88-90%: decr to 80% initially  = to negative fluid balance Acute hypoxemic respiratory failure secondary to chemo toxicity vs. infection (?PCP) - favor PCP  Non small cell lung CA stage 4  Recurrent VTE with decreasing yet persit bilaeral PE's, on lovenox  Smoking history with probable underlying COPD, though had not been treated by outpt pulm for latter - PFT's may have been nl    REC:    Continue solumedrol and abx  Repeat CXR today  Increase solumedrol to 40 qq 8 - further dosing per cxr and clinical status  Taper FIO2 on high flow: target sat 88-90%: decr to 80% initially  = to negative fluid balance  Would deferr bronch/BAL at this time: favor empiric rx for now

## 2017-09-05 NOTE — PROGRESS NOTE ADULT - ASSESSMENT
57M with hx of stage IV lung CA on immunotherapy (Nic). Pt diagnosed Nov 1, 2016, previously on chemo. Hx of PE with failed therapy anticoagulants, now on Lovenox 120 mg BID.. Pt with SOB x 1 week, progressively worsening.  Admitted for acute hypoxic respiratory failure, ?PCP PNA?

## 2017-09-05 NOTE — PROGRESS NOTE ADULT - ASSESSMENT
57 year old man with metastatic NSCLC adenocarcinoma on Keytruda admitted with shortness of breathe. PCP vs. Keytruda side effect.

## 2017-09-06 LAB
ANION GAP SERPL CALC-SCNC: 20 MMOL/L — HIGH (ref 5–17)
BASOPHILS # BLD AUTO: 0.03 K/UL — SIGNIFICANT CHANGE UP (ref 0–0.2)
BASOPHILS NFR BLD AUTO: 0.2 % — SIGNIFICANT CHANGE UP (ref 0–2)
BUN SERPL-MCNC: 33 MG/DL — HIGH (ref 7–23)
CALCIUM SERPL-MCNC: 6.7 MG/DL — LOW (ref 8.4–10.5)
CHLORIDE SERPL-SCNC: 100 MMOL/L — SIGNIFICANT CHANGE UP (ref 96–108)
CO2 SERPL-SCNC: 13 MMOL/L — LOW (ref 22–31)
CREAT SERPL-MCNC: 1.12 MG/DL — SIGNIFICANT CHANGE UP (ref 0.5–1.3)
EOSINOPHIL # BLD AUTO: 0.03 K/UL — SIGNIFICANT CHANGE UP (ref 0–0.5)
EOSINOPHIL NFR BLD AUTO: 0.2 % — SIGNIFICANT CHANGE UP (ref 0–6)
GAS PNL BLDA: SIGNIFICANT CHANGE UP
GLUCOSE SERPL-MCNC: 107 MG/DL — HIGH (ref 70–99)
HCT VFR BLD CALC: 31.1 % — LOW (ref 39–50)
HGB BLD-MCNC: 10.4 G/DL — LOW (ref 13–17)
IMM GRANULOCYTES NFR BLD AUTO: 2.7 % — HIGH (ref 0–1.5)
LYMPHOCYTES # BLD AUTO: 1.08 K/UL — SIGNIFICANT CHANGE UP (ref 1–3.3)
LYMPHOCYTES # BLD AUTO: 8 % — LOW (ref 13–44)
MCHC RBC-ENTMCNC: 31.8 PG — SIGNIFICANT CHANGE UP (ref 27–34)
MCHC RBC-ENTMCNC: 33.4 GM/DL — SIGNIFICANT CHANGE UP (ref 32–36)
MCV RBC AUTO: 95.1 FL — SIGNIFICANT CHANGE UP (ref 80–100)
MONOCYTES # BLD AUTO: 0.56 K/UL — SIGNIFICANT CHANGE UP (ref 0–0.9)
MONOCYTES NFR BLD AUTO: 4.2 % — SIGNIFICANT CHANGE UP (ref 2–14)
NEUTROPHILS # BLD AUTO: 11.36 K/UL — HIGH (ref 1.8–7.4)
NEUTROPHILS NFR BLD AUTO: 84.7 % — HIGH (ref 43–77)
NRBC # BLD: 1 /100 WBCS — HIGH (ref 0–0)
PLATELET # BLD AUTO: 59 K/UL — LOW (ref 150–400)
POTASSIUM SERPL-MCNC: 5.2 MMOL/L — SIGNIFICANT CHANGE UP (ref 3.5–5.3)
POTASSIUM SERPL-SCNC: 5.2 MMOL/L — SIGNIFICANT CHANGE UP (ref 3.5–5.3)
RBC # BLD: 3.27 M/UL — LOW (ref 4.2–5.8)
RBC # FLD: 18.4 % — HIGH (ref 10.3–14.5)
SODIUM SERPL-SCNC: 133 MMOL/L — LOW (ref 135–145)
WBC # BLD: 13.42 K/UL — HIGH (ref 3.8–10.5)
WBC # FLD AUTO: 13.42 K/UL — HIGH (ref 3.8–10.5)

## 2017-09-06 RX ORDER — ALPRAZOLAM 0.25 MG
0.25 TABLET ORAL
Qty: 0 | Refills: 0 | Status: DISCONTINUED | OUTPATIENT
Start: 2017-09-06 | End: 2017-09-07

## 2017-09-06 RX ADMIN — Medication 40 MILLIGRAM(S): at 05:20

## 2017-09-06 RX ADMIN — OXYCODONE AND ACETAMINOPHEN 2 TABLET(S): 5; 325 TABLET ORAL at 00:30

## 2017-09-06 RX ADMIN — Medication 425 MILLIGRAM(S): at 06:10

## 2017-09-06 RX ADMIN — Medication: at 00:52

## 2017-09-06 RX ADMIN — Medication 0.25 MILLIGRAM(S): at 12:46

## 2017-09-06 RX ADMIN — Medication 40 MILLIGRAM(S): at 00:51

## 2017-09-06 RX ADMIN — Medication 40 MILLIGRAM(S): at 18:01

## 2017-09-06 RX ADMIN — Medication 40 MILLIGRAM(S): at 12:51

## 2017-09-06 RX ADMIN — ENOXAPARIN SODIUM 120 MILLIGRAM(S): 100 INJECTION SUBCUTANEOUS at 05:20

## 2017-09-06 RX ADMIN — PIPERACILLIN AND TAZOBACTAM 25 GRAM(S): 4; .5 INJECTION, POWDER, LYOPHILIZED, FOR SOLUTION INTRAVENOUS at 05:21

## 2017-09-06 RX ADMIN — PIPERACILLIN AND TAZOBACTAM 25 GRAM(S): 4; .5 INJECTION, POWDER, LYOPHILIZED, FOR SOLUTION INTRAVENOUS at 12:51

## 2017-09-06 RX ADMIN — Medication 0.25 MILLIGRAM(S): at 19:54

## 2017-09-06 RX ADMIN — PIPERACILLIN AND TAZOBACTAM 25 GRAM(S): 4; .5 INJECTION, POWDER, LYOPHILIZED, FOR SOLUTION INTRAVENOUS at 19:54

## 2017-09-06 RX ADMIN — OXYCODONE AND ACETAMINOPHEN 2 TABLET(S): 5; 325 TABLET ORAL at 05:20

## 2017-09-06 RX ADMIN — Medication 2.5 TABLET(S): at 18:01

## 2017-09-06 RX ADMIN — Medication 166.67 MILLIGRAM(S): at 18:00

## 2017-09-06 RX ADMIN — Medication 2.5 TABLET(S): at 12:52

## 2017-09-06 RX ADMIN — OXYCODONE AND ACETAMINOPHEN 2 TABLET(S): 5; 325 TABLET ORAL at 07:00

## 2017-09-06 RX ADMIN — Medication 166.67 MILLIGRAM(S): at 05:21

## 2017-09-06 RX ADMIN — ENOXAPARIN SODIUM 120 MILLIGRAM(S): 100 INJECTION SUBCUTANEOUS at 18:01

## 2017-09-06 NOTE — PROGRESS NOTE ADULT - ASSESSMENT
Acute respiratory failure  RVP negative  Legionella negative  Blood Cx NTD  ABG with chronic respiratory alkalosis  Lactate on ABG elevates as well.  R/O PCP  R/O Drug toxicity

## 2017-09-06 NOTE — PHYSICAL THERAPY INITIAL EVALUATION ADULT - PERTINENT HX OF CURRENT PROBLEM, REHAB EVAL
56 y/o M--patient followed by Dr. Shaffer and Dr. 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 contd below:

## 2017-09-06 NOTE — PROGRESS NOTE ADULT - ASSESSMENT
57-yo Male with hx of stage IV lung CA on immunotherapy (Nic). Pt diagnosed Nov 1, 2016, previously on chemo. Hx of PE with failed therapy anticoagulants, now on Lovenox 120 mg BID.. Pt with SOB x 1 week, progressively worsening.  Admitted for acute hypoxic respiratory failure, ?PCP PNA?

## 2017-09-06 NOTE — PHYSICAL THERAPY INITIAL EVALUATION ADULT - CRITERIA FOR SKILLED THERAPEUTIC INTERVENTIONS
rehab potential/anticipated discharge recommendation/predicted duration of therapy intervention/therapy frequency/anticipated equipment needs at discharge/impairments found/functional limitations in following categories

## 2017-09-06 NOTE — PROGRESS NOTE ADULT - ASSESSMENT
57 year old man with metastatic NSCLC adenocarcinoma on Keytruda admitted with shortness of breathe. PCP vs. Keytruda side effect. Pulmonary favors PCP.

## 2017-09-06 NOTE — PHYSICAL THERAPY INITIAL EVALUATION ADULT - ADDITIONAL COMMENTS
contd from above: with patient noted in workup with pulmonary emboli previously on Pradaxa but with patient transitioned to Lovenox following suspected breakthrough of PE on Pradaxa, with the patient now on immunotherapy and Decadron with the patient with fever, productive cough for 4 days with scant epistaxis and was convinced by spouse to self refer to the ER.  Patient now on BiPAP in the ER with improvement of symptoms. CXR: (-) pleural effusions; CT angio chest: right hilar/right upper lobe mass consistent with known malignancy contd from above: with patient noted in workup with pulmonary emboli previously on Pradaxa but with patient transitioned to Lovenox following suspected breakthrough of PE on Pradaxa, with the patient now on immunotherapy and Decadron with the patient with fever, productive cough for 4 days with scant epistaxis and was convinced by spouse to self refer to the ER.  Patient now on BiPAP in the ER with improvement of symptoms. CXR: (-) pleural effusions; CT angio chest: right hilar/right upper lobe mass consistent with known malignancy    social history: pt lives with family in private house, few steps to enter. pt independent with all mobility and did not use assistive device

## 2017-09-06 NOTE — PROGRESS NOTE ADULT - ASSESSMENT
Acute hypoxemic respiratory failure secondary to chemo toxicity vs. infection (?PCP) - favor PCP  Non small cell lung CA stage 4  Recurrent VTE with decreasing yet persit bilaeral PE's, on lovenox  Smoking history with probable underlying COPD, though had not been treated by outpt pulm for latter - PFT's may have been nl    REC:    Continue solumedrol at 40 q 6 and abx per ID  Repeat CXR 9/7  = to negative fluid balance  Would deferr bronch/BAL at this time: favor empiric rx for now

## 2017-09-07 LAB
ACETONE SERPL-MCNC: NEGATIVE — SIGNIFICANT CHANGE UP
ALBUMIN SERPL ELPH-MCNC: 2.9 G/DL — LOW (ref 3.3–5)
ALBUMIN SERPL ELPH-MCNC: 3.1 G/DL — LOW (ref 3.3–5)
ALP SERPL-CCNC: 171 U/L — HIGH (ref 40–120)
ALP SERPL-CCNC: 188 U/L — HIGH (ref 40–120)
ALT FLD-CCNC: 21 U/L RC — SIGNIFICANT CHANGE UP (ref 10–45)
ALT FLD-CCNC: 21 U/L RC — SIGNIFICANT CHANGE UP (ref 10–45)
ANION GAP SERPL CALC-SCNC: 17 MMOL/L — SIGNIFICANT CHANGE UP (ref 5–17)
ANION GAP SERPL CALC-SCNC: 19 MMOL/L — HIGH (ref 5–17)
ANION GAP SERPL CALC-SCNC: 20 MMOL/L — HIGH (ref 5–17)
ANION GAP SERPL CALC-SCNC: 22 MMOL/L — HIGH (ref 5–17)
APPEARANCE UR: ABNORMAL
APTT BLD: 28.9 SEC — SIGNIFICANT CHANGE UP (ref 27.5–37.4)
AST SERPL-CCNC: 40 U/L — SIGNIFICANT CHANGE UP (ref 10–40)
AST SERPL-CCNC: 68 U/L — HIGH (ref 10–40)
BASOPHILS # BLD AUTO: 0.1 K/UL — SIGNIFICANT CHANGE UP (ref 0–0.2)
BASOPHILS NFR BLD AUTO: 0.3 % — SIGNIFICANT CHANGE UP (ref 0–2)
BILIRUB SERPL-MCNC: 0.5 MG/DL — SIGNIFICANT CHANGE UP (ref 0.2–1.2)
BILIRUB SERPL-MCNC: 0.7 MG/DL — SIGNIFICANT CHANGE UP (ref 0.2–1.2)
BILIRUB UR-MCNC: NEGATIVE — SIGNIFICANT CHANGE UP
BUN SERPL-MCNC: 41 MG/DL — HIGH (ref 7–23)
BUN SERPL-MCNC: 46 MG/DL — HIGH (ref 7–23)
BUN SERPL-MCNC: 50 MG/DL — HIGH (ref 7–23)
BUN SERPL-MCNC: 51 MG/DL — HIGH (ref 7–23)
CALCIUM SERPL-MCNC: 7.1 MG/DL — LOW (ref 8.4–10.5)
CALCIUM SERPL-MCNC: 7.1 MG/DL — LOW (ref 8.4–10.5)
CALCIUM SERPL-MCNC: 7.2 MG/DL — LOW (ref 8.4–10.5)
CALCIUM SERPL-MCNC: 7.2 MG/DL — LOW (ref 8.4–10.5)
CHLORIDE SERPL-SCNC: 101 MMOL/L — SIGNIFICANT CHANGE UP (ref 96–108)
CHLORIDE SERPL-SCNC: 97 MMOL/L — SIGNIFICANT CHANGE UP (ref 96–108)
CHLORIDE SERPL-SCNC: 98 MMOL/L — SIGNIFICANT CHANGE UP (ref 96–108)
CHLORIDE SERPL-SCNC: 98 MMOL/L — SIGNIFICANT CHANGE UP (ref 96–108)
CK MB BLD-MCNC: 2.3 % — SIGNIFICANT CHANGE UP (ref 0–3.5)
CK MB BLD-MCNC: 3.1 % — SIGNIFICANT CHANGE UP (ref 0–3.5)
CK MB CFR SERPL CALC: 4.9 NG/ML — SIGNIFICANT CHANGE UP (ref 0–6.7)
CK MB CFR SERPL CALC: 5 NG/ML — SIGNIFICANT CHANGE UP (ref 0–6.7)
CK SERPL-CCNC: 157 U/L — SIGNIFICANT CHANGE UP (ref 30–200)
CK SERPL-CCNC: 222 U/L — HIGH (ref 30–200)
CO2 SERPL-SCNC: 11 MMOL/L — LOW (ref 22–31)
CO2 SERPL-SCNC: 16 MMOL/L — LOW (ref 22–31)
CO2 SERPL-SCNC: 16 MMOL/L — LOW (ref 22–31)
CO2 SERPL-SCNC: 17 MMOL/L — LOW (ref 22–31)
COD CRY URNS QL: ABNORMAL
COLOR SPEC: YELLOW — SIGNIFICANT CHANGE UP
CREAT ?TM UR-MCNC: 74 MG/DL — SIGNIFICANT CHANGE UP
CREAT SERPL-MCNC: 1.4 MG/DL — HIGH (ref 0.5–1.3)
CREAT SERPL-MCNC: 1.58 MG/DL — HIGH (ref 0.5–1.3)
CREAT SERPL-MCNC: 1.59 MG/DL — HIGH (ref 0.5–1.3)
CREAT SERPL-MCNC: 1.64 MG/DL — HIGH (ref 0.5–1.3)
DIFF PNL FLD: ABNORMAL
EOSINOPHIL # BLD AUTO: 0.1 K/UL — SIGNIFICANT CHANGE UP (ref 0–0.5)
EOSINOPHIL NFR BLD AUTO: 0.3 % — SIGNIFICANT CHANGE UP (ref 0–6)
GAS PNL BLDA: SIGNIFICANT CHANGE UP
GLUCOSE SERPL-MCNC: 125 MG/DL — HIGH (ref 70–99)
GLUCOSE SERPL-MCNC: 153 MG/DL — HIGH (ref 70–99)
GLUCOSE SERPL-MCNC: 156 MG/DL — HIGH (ref 70–99)
GLUCOSE SERPL-MCNC: 170 MG/DL — HIGH (ref 70–99)
GLUCOSE UR QL: NEGATIVE — SIGNIFICANT CHANGE UP
HCT VFR BLD CALC: 31.2 % — LOW (ref 39–50)
HGB BLD-MCNC: 10.8 G/DL — LOW (ref 13–17)
INR BLD: 1.33 RATIO — HIGH (ref 0.88–1.16)
KETONES UR-MCNC: NEGATIVE — SIGNIFICANT CHANGE UP
LEUKOCYTE ESTERASE UR-ACNC: NEGATIVE — SIGNIFICANT CHANGE UP
LYMPHOCYTES # BLD AUTO: 13.9 % — SIGNIFICANT CHANGE UP (ref 13–44)
LYMPHOCYTES # BLD AUTO: 2.6 K/UL — SIGNIFICANT CHANGE UP (ref 1–3.3)
MAGNESIUM SERPL-MCNC: 3.2 MG/DL — HIGH (ref 1.6–2.6)
MCHC RBC-ENTMCNC: 34.7 GM/DL — SIGNIFICANT CHANGE UP (ref 32–36)
MCHC RBC-ENTMCNC: 34.7 PG — HIGH (ref 27–34)
MCV RBC AUTO: 100 FL — SIGNIFICANT CHANGE UP (ref 80–100)
MONOCYTES # BLD AUTO: 0.8 K/UL — SIGNIFICANT CHANGE UP (ref 0–0.9)
MONOCYTES NFR BLD AUTO: 4.3 % — SIGNIFICANT CHANGE UP (ref 2–14)
NEUTROPHILS # BLD AUTO: 15.4 K/UL — HIGH (ref 1.8–7.4)
NEUTROPHILS NFR BLD AUTO: 81.3 % — HIGH (ref 43–77)
NITRITE UR-MCNC: NEGATIVE — SIGNIFICANT CHANGE UP
PH UR: 6 — SIGNIFICANT CHANGE UP (ref 5–8)
PHOSPHATE SERPL-MCNC: 3.6 MG/DL — SIGNIFICANT CHANGE UP (ref 2.5–4.5)
PLATELET # BLD AUTO: 61 K/UL — LOW (ref 150–400)
POTASSIUM SERPL-MCNC: 5.2 MMOL/L — SIGNIFICANT CHANGE UP (ref 3.5–5.3)
POTASSIUM SERPL-MCNC: 5.5 MMOL/L — HIGH (ref 3.5–5.3)
POTASSIUM SERPL-MCNC: 6 MMOL/L — HIGH (ref 3.5–5.3)
POTASSIUM SERPL-MCNC: 6.4 MMOL/L — CRITICAL HIGH (ref 3.5–5.3)
POTASSIUM SERPL-SCNC: 5.2 MMOL/L — SIGNIFICANT CHANGE UP (ref 3.5–5.3)
POTASSIUM SERPL-SCNC: 5.5 MMOL/L — HIGH (ref 3.5–5.3)
POTASSIUM SERPL-SCNC: 6 MMOL/L — HIGH (ref 3.5–5.3)
POTASSIUM SERPL-SCNC: 6.4 MMOL/L — CRITICAL HIGH (ref 3.5–5.3)
PROT SERPL-MCNC: 6.1 G/DL — SIGNIFICANT CHANGE UP (ref 6–8.3)
PROT SERPL-MCNC: 6.6 G/DL — SIGNIFICANT CHANGE UP (ref 6–8.3)
PROT UR-MCNC: 30 MG/DL
PROTHROM AB SERPL-ACNC: 14.6 SEC — HIGH (ref 9.8–12.7)
RBC # BLD: 3.11 M/UL — LOW (ref 4.2–5.8)
RBC # FLD: 16.9 % — HIGH (ref 10.3–14.5)
RBC CASTS # UR COMP ASSIST: ABNORMAL /HPF (ref 0–2)
SODIUM SERPL-SCNC: 131 MMOL/L — LOW (ref 135–145)
SODIUM SERPL-SCNC: 131 MMOL/L — LOW (ref 135–145)
SODIUM SERPL-SCNC: 135 MMOL/L — SIGNIFICANT CHANGE UP (ref 135–145)
SODIUM SERPL-SCNC: 135 MMOL/L — SIGNIFICANT CHANGE UP (ref 135–145)
SP GR SPEC: 1.02 — SIGNIFICANT CHANGE UP (ref 1.01–1.02)
TROPONIN T SERPL-MCNC: <0.01 NG/ML — SIGNIFICANT CHANGE UP (ref 0–0.06)
TROPONIN T SERPL-MCNC: <0.01 NG/ML — SIGNIFICANT CHANGE UP (ref 0–0.06)
UROBILINOGEN FLD QL: NEGATIVE — SIGNIFICANT CHANGE UP
UUN UR-MCNC: 992 MG/DL — SIGNIFICANT CHANGE UP
WBC # BLD: 19 K/UL — HIGH (ref 3.8–10.5)
WBC # FLD AUTO: 19 K/UL — HIGH (ref 3.8–10.5)
WBC UR QL: SIGNIFICANT CHANGE UP /HPF (ref 0–5)

## 2017-09-07 PROCEDURE — 92950 HEART/LUNG RESUSCITATION CPR: CPT | Mod: GC

## 2017-09-07 PROCEDURE — 36680 INSERT NEEDLE BONE CAVITY: CPT | Mod: GC

## 2017-09-07 PROCEDURE — 31500 INSERT EMERGENCY AIRWAY: CPT

## 2017-09-07 PROCEDURE — 71010: CPT | Mod: 26

## 2017-09-07 PROCEDURE — 93010 ELECTROCARDIOGRAM REPORT: CPT

## 2017-09-07 RX ORDER — NICOTINE POLACRILEX 2 MG
1 GUM BUCCAL DAILY
Qty: 0 | Refills: 0 | Status: DISCONTINUED | OUTPATIENT
Start: 2017-09-07 | End: 2017-09-07

## 2017-09-07 RX ORDER — SODIUM CHLORIDE 9 MG/ML
1000 INJECTION, SOLUTION INTRAVENOUS
Qty: 0 | Refills: 0 | Status: DISCONTINUED | OUTPATIENT
Start: 2017-09-07 | End: 2017-09-07

## 2017-09-07 RX ORDER — DEXTROSE 50 % IN WATER 50 %
1 SYRINGE (ML) INTRAVENOUS ONCE
Qty: 0 | Refills: 0 | Status: DISCONTINUED | OUTPATIENT
Start: 2017-09-07 | End: 2017-09-07

## 2017-09-07 RX ORDER — ALBUTEROL 90 UG/1
2.5 AEROSOL, METERED ORAL ONCE
Qty: 0 | Refills: 0 | Status: COMPLETED | OUTPATIENT
Start: 2017-09-07 | End: 2017-09-07

## 2017-09-07 RX ORDER — DEXTROSE 50 % IN WATER 50 %
25 SYRINGE (ML) INTRAVENOUS ONCE
Qty: 0 | Refills: 0 | Status: DISCONTINUED | OUTPATIENT
Start: 2017-09-07 | End: 2017-09-07

## 2017-09-07 RX ORDER — MORPHINE SULFATE 50 MG/1
1 CAPSULE, EXTENDED RELEASE ORAL EVERY 8 HOURS
Qty: 0 | Refills: 0 | Status: DISCONTINUED | OUTPATIENT
Start: 2017-09-07 | End: 2017-09-07

## 2017-09-07 RX ORDER — ATOVAQUONE 750 MG/5ML
750 SUSPENSION ORAL
Qty: 0 | Refills: 0 | Status: DISCONTINUED | OUTPATIENT
Start: 2017-09-07 | End: 2017-09-07

## 2017-09-07 RX ORDER — DEXTROSE 50 % IN WATER 50 %
50 SYRINGE (ML) INTRAVENOUS ONCE
Qty: 0 | Refills: 0 | Status: COMPLETED | OUTPATIENT
Start: 2017-09-07 | End: 2017-09-07

## 2017-09-07 RX ORDER — DEXMEDETOMIDINE HYDROCHLORIDE IN 0.9% SODIUM CHLORIDE 4 UG/ML
0.2 INJECTION INTRAVENOUS
Qty: 200 | Refills: 0 | Status: DISCONTINUED | OUTPATIENT
Start: 2017-09-07 | End: 2017-09-07

## 2017-09-07 RX ORDER — HYDROMORPHONE HYDROCHLORIDE 2 MG/ML
0.5 INJECTION INTRAMUSCULAR; INTRAVENOUS; SUBCUTANEOUS EVERY 4 HOURS
Qty: 0 | Refills: 0 | Status: DISCONTINUED | OUTPATIENT
Start: 2017-09-07 | End: 2017-09-07

## 2017-09-07 RX ORDER — DEXTROSE 50 % IN WATER 50 %
12.5 SYRINGE (ML) INTRAVENOUS ONCE
Qty: 0 | Refills: 0 | Status: DISCONTINUED | OUTPATIENT
Start: 2017-09-07 | End: 2017-09-07

## 2017-09-07 RX ORDER — CALCIUM CARBONATE 500(1250)
1 TABLET ORAL EVERY 6 HOURS
Qty: 0 | Refills: 0 | Status: DISCONTINUED | OUTPATIENT
Start: 2017-09-07 | End: 2017-09-07

## 2017-09-07 RX ORDER — ACETAMINOPHEN 500 MG
650 TABLET ORAL ONCE
Qty: 0 | Refills: 0 | Status: DISCONTINUED | OUTPATIENT
Start: 2017-09-07 | End: 2017-09-07

## 2017-09-07 RX ORDER — GLUCAGON INJECTION, SOLUTION 0.5 MG/.1ML
1 INJECTION, SOLUTION SUBCUTANEOUS ONCE
Qty: 0 | Refills: 0 | Status: DISCONTINUED | OUTPATIENT
Start: 2017-09-07 | End: 2017-09-07

## 2017-09-07 RX ORDER — INSULIN LISPRO 100/ML
VIAL (ML) SUBCUTANEOUS EVERY 6 HOURS
Qty: 0 | Refills: 0 | Status: DISCONTINUED | OUTPATIENT
Start: 2017-09-07 | End: 2017-09-07

## 2017-09-07 RX ORDER — MORPHINE SULFATE 50 MG/1
1 CAPSULE, EXTENDED RELEASE ORAL ONCE
Qty: 0 | Refills: 0 | Status: DISCONTINUED | OUTPATIENT
Start: 2017-09-07 | End: 2017-09-07

## 2017-09-07 RX ORDER — CALCIUM GLUCONATE 100 MG/ML
1 VIAL (ML) INTRAVENOUS ONCE
Qty: 0 | Refills: 0 | Status: DISCONTINUED | OUTPATIENT
Start: 2017-09-07 | End: 2017-09-07

## 2017-09-07 RX ORDER — FUROSEMIDE 40 MG
40 TABLET ORAL ONCE
Qty: 0 | Refills: 0 | Status: DISCONTINUED | OUTPATIENT
Start: 2017-09-07 | End: 2017-09-07

## 2017-09-07 RX ORDER — INSULIN HUMAN 100 [IU]/ML
10 INJECTION, SOLUTION SUBCUTANEOUS ONCE
Qty: 0 | Refills: 0 | Status: COMPLETED | OUTPATIENT
Start: 2017-09-07 | End: 2017-09-07

## 2017-09-07 RX ORDER — SODIUM BICARBONATE 1 MEQ/ML
100 SYRINGE (ML) INTRAVENOUS ONCE
Qty: 0 | Refills: 0 | Status: COMPLETED | OUTPATIENT
Start: 2017-09-07 | End: 2017-09-07

## 2017-09-07 RX ORDER — VANCOMYCIN HCL 1 G
1250 VIAL (EA) INTRAVENOUS EVERY 12 HOURS
Qty: 0 | Refills: 0 | Status: DISCONTINUED | OUTPATIENT
Start: 2017-09-07 | End: 2017-09-07

## 2017-09-07 RX ORDER — CALCIUM GLUCONATE 100 MG/ML
2 VIAL (ML) INTRAVENOUS ONCE
Qty: 0 | Refills: 0 | Status: COMPLETED | OUTPATIENT
Start: 2017-09-07 | End: 2017-09-07

## 2017-09-07 RX ORDER — PIPERACILLIN AND TAZOBACTAM 4; .5 G/20ML; G/20ML
3.38 INJECTION, POWDER, LYOPHILIZED, FOR SOLUTION INTRAVENOUS EVERY 8 HOURS
Qty: 0 | Refills: 0 | Status: DISCONTINUED | OUTPATIENT
Start: 2017-09-07 | End: 2017-09-07

## 2017-09-07 RX ORDER — ENOXAPARIN SODIUM 100 MG/ML
120 INJECTION SUBCUTANEOUS EVERY 12 HOURS
Qty: 0 | Refills: 0 | Status: DISCONTINUED | OUTPATIENT
Start: 2017-09-07 | End: 2017-09-07

## 2017-09-07 RX ORDER — INSULIN HUMAN 100 [IU]/ML
10 INJECTION, SOLUTION SUBCUTANEOUS ONCE
Qty: 0 | Refills: 0 | Status: DISCONTINUED | OUTPATIENT
Start: 2017-09-07 | End: 2017-09-07

## 2017-09-07 RX ADMIN — Medication 1: at 17:35

## 2017-09-07 RX ADMIN — Medication 0.5 MILLIGRAM(S): at 20:08

## 2017-09-07 RX ADMIN — ALBUTEROL 2.5 MILLIGRAM(S): 90 AEROSOL, METERED ORAL at 18:36

## 2017-09-07 RX ADMIN — Medication 166.67 MILLIGRAM(S): at 05:52

## 2017-09-07 RX ADMIN — HYDROMORPHONE HYDROCHLORIDE 0.5 MILLIGRAM(S): 2 INJECTION INTRAMUSCULAR; INTRAVENOUS; SUBCUTANEOUS at 16:23

## 2017-09-07 RX ADMIN — Medication 50 MILLILITER(S): at 18:45

## 2017-09-07 RX ADMIN — Medication 400 GRAM(S): at 07:57

## 2017-09-07 RX ADMIN — HYDROMORPHONE HYDROCHLORIDE 0.5 MILLIGRAM(S): 2 INJECTION INTRAMUSCULAR; INTRAVENOUS; SUBCUTANEOUS at 20:40

## 2017-09-07 RX ADMIN — PIPERACILLIN AND TAZOBACTAM 25 GRAM(S): 4; .5 INJECTION, POWDER, LYOPHILIZED, FOR SOLUTION INTRAVENOUS at 05:52

## 2017-09-07 RX ADMIN — Medication 40 MILLIGRAM(S): at 11:23

## 2017-09-07 RX ADMIN — HYDROMORPHONE HYDROCHLORIDE 0.5 MILLIGRAM(S): 2 INJECTION INTRAMUSCULAR; INTRAVENOUS; SUBCUTANEOUS at 20:25

## 2017-09-07 RX ADMIN — INSULIN HUMAN 10 UNIT(S): 100 INJECTION, SOLUTION SUBCUTANEOUS at 18:45

## 2017-09-07 RX ADMIN — PIPERACILLIN AND TAZOBACTAM 25 GRAM(S): 4; .5 INJECTION, POWDER, LYOPHILIZED, FOR SOLUTION INTRAVENOUS at 13:22

## 2017-09-07 RX ADMIN — ENOXAPARIN SODIUM 120 MILLIGRAM(S): 100 INJECTION SUBCUTANEOUS at 17:35

## 2017-09-07 RX ADMIN — PIPERACILLIN AND TAZOBACTAM 25 GRAM(S): 4; .5 INJECTION, POWDER, LYOPHILIZED, FOR SOLUTION INTRAVENOUS at 22:15

## 2017-09-07 RX ADMIN — Medication 2.5 TABLET(S): at 00:29

## 2017-09-07 RX ADMIN — Medication 166.67 MILLIGRAM(S): at 17:35

## 2017-09-07 RX ADMIN — HYDROMORPHONE HYDROCHLORIDE 0.5 MILLIGRAM(S): 2 INJECTION INTRAMUSCULAR; INTRAVENOUS; SUBCUTANEOUS at 11:29

## 2017-09-07 RX ADMIN — Medication 40 MILLIGRAM(S): at 17:35

## 2017-09-07 RX ADMIN — Medication 1 TABLET(S): at 11:19

## 2017-09-07 RX ADMIN — MORPHINE SULFATE 1 MILLIGRAM(S): 50 CAPSULE, EXTENDED RELEASE ORAL at 06:43

## 2017-09-07 RX ADMIN — Medication 0.25 MILLIGRAM(S): at 00:37

## 2017-09-07 RX ADMIN — ATOVAQUONE 750 MILLIGRAM(S): 750 SUSPENSION ORAL at 17:35

## 2017-09-07 RX ADMIN — ENOXAPARIN SODIUM 120 MILLIGRAM(S): 100 INJECTION SUBCUTANEOUS at 06:11

## 2017-09-07 RX ADMIN — Medication 100 MILLIEQUIVALENT(S): at 07:57

## 2017-09-07 RX ADMIN — Medication 40 MILLIGRAM(S): at 05:53

## 2017-09-07 RX ADMIN — Medication 40 MILLIGRAM(S): at 00:30

## 2017-09-07 RX ADMIN — SODIUM CHLORIDE 75 MILLILITER(S): 9 INJECTION, SOLUTION INTRAVENOUS at 11:23

## 2017-09-07 RX ADMIN — MORPHINE SULFATE 1 MILLIGRAM(S): 50 CAPSULE, EXTENDED RELEASE ORAL at 07:30

## 2017-09-07 NOTE — PROGRESS NOTE ADULT - PROBLEM SELECTOR PLAN 1
high-flow nasal cannula  solumedrol 40 mg IVPB q6h; repeat CXR tomorrow  on IV bactrim 2' recent decadron usage  cont vanco/zosyn  appreciate pulm  repeat ABG with any clinical changes  holding off on bronchoscopy
high-flow nasal cannula  solumedrol increased today  on IV bactrim 2' recent decadron usage  cont vanco/zosyn  repeat CXR today  appreciate pulm  repeat ABG with any clinical changes
No change in respiratory function. On HiFo Nasal Cannula. Believed to be PCP causing his distress  - Continue Hi-Flow oxygen  - Continue steroids and Abx. Steroids increased and Abx increased by pulmonary  - Care as per pulmonary. Bronch/BAL on hold for now.
No change in respiratory function. On HiFo Nasal Cannula. Believed to be PCP causing his distress  - Now on BIPAP.  - Continue steroids and Abx.   - Care as per MICU
POD  as  documented    now  with bilat  interstitial infiltrates    DDX---drug  rxn  vs  PCP      Pulmonary  has  placed  him  on  RX  for  both    Anti-neoplastic  therapy  on  hold    supportive  measures  continuing
Tenuous status persists.
No change in respiratory function. On BIPAP. Still poor clinical status.  PCP vs. Keytruda side effect  - Continue BIPAP  - Continue steroids and Bactrim  - Care as per pulmonary
Tenuous but stable presently  Rx as per pulmonary

## 2017-09-07 NOTE — PROGRESS NOTE ADULT - PROBLEM SELECTOR PROBLEM 4
Thrombocytopenia
Thrombocytopenia
R/O Pneumocystis jiroveci pneumonia
R/O Pneumocystis jiroveci pneumonia

## 2017-09-07 NOTE — PROGRESS NOTE ADULT - PROBLEM SELECTOR PLAN 2
Stage 4.  Keytruda on hold.  On IV solumedrol instead of decadron for the time being.  Appreciate oncology
Stage 4.  Keytruda on hold.  On IV solumedrol instead of decadron for the time being.  Appreciate oncology
Currently on Keytruda  - Clinical status has been slowly deteriorating in the past 3 weeks.   - Clinical status precludes him from receiving treatment currently. Will discuss with Dr. Lucero. Will need GOC discussion with family and patient.
Currently on Keytruda  - Clinical status has been slowly deteriorating in the past 3 weeks.   - Unsure if Keytruda will be continued if there is still suspicion for SOB being side effect.  - Will monitor for now.
No Rx presently  S/P Chemo  Dobut in/of itself responsible for respiratory failure
Currently on keytruda  - Clinical status has been slowly deteriorating in the past 3 weeks.   - Will likely not be able to be treated with keytruda unless PCP is diagnosed.   - Will monitor for now.
No Rx presently  S/P Chemo  Dobut in/of itself responsible for respiratory failure

## 2017-09-07 NOTE — PROGRESS NOTE ADULT - PROBLEM SELECTOR PROBLEM 1
Acute respiratory failure with hypoxemia
Non-small cell carcinoma of lung
Acute respiratory failure with hypoxemia
Acute respiratory failure with hypoxemia

## 2017-09-07 NOTE — PROGRESS NOTE ADULT - PROBLEM SELECTOR PLAN 3
Will continue with SC Lovenox for now
therapeutic lovenox
As per pulmonary  Agree unlikely to be contributing substantially to hypoxemia
Breakthrough PE on Pradaxa. On Lovenox. Tolerating  - Continue Anticoagulation as long as clinical benefits outweighs risks  - Will follow    Obdulio Ambrocio MD  [facility] Division [facility]   [address] [place] [address]  (T) [phone]  (F) [phone]
Breakthrough PE on Pradaxa. On Lovenox. Tolerating  - Continue Anticoagulation as long as clinical benefits outweighs risks  - Will follow    Obdulio Ambrocio MD  [facility] - [facility]   [address] [place] [address]  (T) [phone]  (F) [phone]
As per pulmonary  Agree unlikely to be contributing substantially to hypoxemia
Breakthrough PE on Pradaxa. On Lovenox  - Continue Anticoagulation as long as clinical benefits outweight risks  - Will follow    Obdulio Ambrocio MD  [facility] - [facility]   [address] [place] [address] [address]  [place] [address]  (T) [phone]  (F) [phone]

## 2017-09-07 NOTE — PROGRESS NOTE ADULT - ASSESSMENT
Acute hypoxemic respiratory failure secondary to chemo toxicity vs. infection (?PCP) - favor PCP  Non small cell lung CA stage 4  Recurrent VTE with decreasing yet persit bilaeral PE's, on lovenox  Smoking history with probable underlying COPD, though had not been treated by outpt pulm for latter - PFT's may have been nl  Pre-renal azotemia  Met acidsosis due to hyperventilation    REC:    Continue solumedrol at 40 q 6 and abx per ID  Favor crystalloid rehydration with NS, or limit HCO3  See no indication for bronchoscopy at this time  Encourage BIPAP for dyspnea relief  Consider very low dose anxiolytics  Awaiting fungitell

## 2017-09-07 NOTE — PROVIDER CONTACT NOTE (CHANGE IN STATUS NOTIFICATION) - ACTION/TREATMENT ORDERED:
Dr. Trujillo, came to bedside, examined pt; will ordered ativan one time and will reassess blood gas

## 2017-09-07 NOTE — CONSULT NOTE ADULT - ATTENDING COMMENTS
This is a 57 year old man with metastatic NSCLC adenocarcinoma w/ bone mets s/p chemotherapy, now on immunotherapy (diagnosed in Nov.), heavy smoker, pulmonary emboli (failed on Pradaxa) now on Enoxaparin, initially presented w/ SOB admitted w/ hypoxic respiratory failure. Patient followed by pulmonary, ID, and oncology. Hospital course complicated by worsening hypoxic respiratory failure, requiring hi-flow, and most recently BIPAP.  Patient this am was the subject of an RRT for worsening hypoxemic respiratory failure and subsequently upgraded to the MICU.   1. Acute on chronic hypoxemic respiratory failure - with tachypnea and air hunger- Multifactorial worsening tumor burden with post-obstructive phenomenon, immunotherapy induced pneumonitis, progressive PNA ( including PCP) coupled with severe anxiety and acute kidney injury.- patient now on NIV with O2sats goal > 88% as patient is still active smoker, pulmonary toilet, IV steroids, ABX- tailor according to C/S and clinical response.    2. acute renal failure- ATN versus pre-renal phenomenon - now with metabolic acidosis and hyperkalemia as well as hypo-osmolar hyponatremia - mixed etiology   3.advance metastatic NSCLC- care as per oncology  Dispo/ GOC- I have began discussions with the patient regarding resuscitation and advance airway placement and at this juncture he will like to discuss with his family and come to a concerted discission. Will also get advance illness team to further delineate patient's acute and subacute goals of care.  Case discussed extensively with patient, staff, team, and specialist on board.

## 2017-09-07 NOTE — CONSULT NOTE ADULT - SUBJECTIVE AND OBJECTIVE BOX
CHIEF COMPLAINT: hypoxic resp. failure    HPI:  57 year old man with metastatic NSCLC adenocarcinoma w/ bone mets s/p chemotherapy, now on immunotherapy-Keytruda (diagnosed in Nov.), heavy smoker, pulmonary emboli (failed on pradaxa) now on lovenox, initially presented w/ SOB admitted w/ hypoxic respiratory failure. Patient followed by pulmonary, ID, and oncology. Hospital course complicated by worsening hypoxic respiratory failure, requiring hi-flow, and most recently BIPAP.  Vent settings at that time: FIO2 100%, 10/5, Rate 14. An RRT was called 9/6 for oxygenation desaturation. During the rapid, patient received 40IV lasix, and vent settings were changed to FIO2 100%, 12/7, Rate 14.     MICU was called s/p RRT for tachypnea on BIPAP.    Patient on vent settings, was breathing at rate of 30s, w/ tidal volumes of 1200, saturating 89-93%. Patient did not complain of respiratory weakness. Did complain of anxiety and SOB. No complaints of CP, palpitations, fevers, chills, N/V, abdominal pain, urinary symptoms. All other ROS is negative.    Pertinent Labs:  WBC 19.0, Hgb/Hct 10.8/31.2, platelets 61, Na 131, K+6.0, Chloride 101, HCO3 11, BUN 41, Cr 1.40, Ca 7.8, Ce x 1 negative  pH 7.42 [id number], PO2 61, O2 sat 89% on vent settings  FIO2 100%, 12/7, Rate 14      PAST MEDICAL & SURGICAL HISTORY:  Other chronic pulmonary embolism  Non-small cell carcinoma of lung: diagnosed 11/2016  Cancer: Lung cancer  No significant past surgical history      FAMILY HISTORY:  No pertinent family history in first degree relatives      SOCIAL HISTORY:  Smoking: [ ] Never Smoked [ ] Former Smoker (__ packs x ___ years) [x] Current Smoker  (__ packs x ___ years)  Substance Use: [X] Never Used [ ] Used ____  Marital Status: [ ] Single [ ]  [ ]  [ ]     Advance Directives:    Allergies    No Known Allergies    Intolerances        HOME MEDICATIONS:    REVIEW OF SYSTEMS:  Constitutional: [X] negative [ ] fevers [ ] chills [ ] weight loss [ ] weight gain  HEENT: [X] negative [ ] dry eyes [ ] eye irritation [ ] postnasal drip [ ] nasal congestion  CV: [X] negative  [ ] chest pain [ ] orthopnea [ ] palpitations [ ] murmur  Resp: [ ] negative [ ] cough [x] shortness of breath [X] dyspnea [ ] wheezing [ ] sputum [ ] hemoptysis  GI: [X] negative [ ] nausea [ ] vomiting [ ] diarrhea [ ] constipation [ ] abd pain [ ] dysphagia   : [X] negative [ ] dysuria [ ] nocturia [ ] hematuria [ ] increased urinary frequency  Musculoskeletal: [X] negative [ ] back pain [ ] myalgias [ ] arthralgias [ ] fracture  Skin: [X] negative [ ] rash [ ] itch  Neurological: [X] negative [ ] headache [ ] dizziness [ ] syncope [ ] weakness [ ] numbness  Psychiatric: [X] negative [ ] anxiety [ ] depression  Endocrine: [X] negative [ ] diabetes [ ] thyroid problem  Hematologic/Lymphatic: [X] negative [ ] anemia [ ] bleeding problem  Allergic/Immunologic: [X] negative [ ] itchy eyes [ ] nasal discharge [ ] hives [ ] angioedema  [ ] All other systems negative  [ ] Unable to assess ROS because ________    OBJECTIVE:  ICU Vital Signs Last 24 Hrs  T(C): 36.7 (06 Sep 2017 20:45), Max: 36.7 (06 Sep 2017 12:24)  T(F): 98 (06 Sep 2017 20:45), Max: 98 (06 Sep 2017 12:24)  HR: 117 (07 Sep 2017 04:07) (88 - 117)  BP: 121/65 (07 Sep 2017 04:07) (115/72 - 121/65)  BP(mean): --  ABP: --  ABP(mean): --  RR: 18 (06 Sep 2017 20:45) (18 - 19)  SpO2: 94% (06 Sep 2017 23:02) (90% - 94%)        09-05 @ 07:01  -  09-06 @ 07:00  --------------------------------------------------------  IN: 2650 mL / OUT: 1500 mL / NET: 1150 mL    09-06 @ 07:01  -  09-07 @ 06:30  --------------------------------------------------------  IN: 890 mL / OUT: 1000 mL / NET: -110 mL      CAPILLARY BLOOD GLUCOSE  131 (07 Sep 2017 05:54)          PHYSICAL EXAM:  General: moderate respiratory distress  HEENT: PERRLA, clear conjunctivae  Lymph Nodes: no lymphadenopathy  Neck: supple  Respiratory: no wheezing, rhonchi, no crackles, on BIPAP  Cardiovascular: S1, S2, tachy   Abdomen: non-tender, non-distended, soft  Extremities: no LE edema   Skin: no rashes  Neurological: axo x 4  Psychiatry: anxious    LINES:     HOSPITAL MEDICATIONS:  enoxaparin Injectable 120 milliGRAM(s) SubCutaneous every 12 hours    piperacillin/tazobactam IVPB. 3.375 Gram(s) IV Intermittent every 8 hours  vancomycin  IVPB 1250 milliGRAM(s) IV Intermittent every 12 hours  trimethoprim  160 mG/sulfamethoxazole 800 mG 2.5 Tablet(s) Oral every 6 hours    furosemide   Injectable 40 milliGRAM(s) IV Push once    insulin lispro (HumaLOG) corrective regimen sliding scale   SubCutaneous every 6 hours  dextrose Gel 1 Dose(s) Oral once PRN  dextrose 50% Injectable 12.5 Gram(s) IV Push once  dextrose 50% Injectable 25 Gram(s) IV Push once  dextrose 50% Injectable 25 Gram(s) IV Push once  glucagon  Injectable 1 milliGRAM(s) IntraMuscular once PRN  methylPREDNISolone sodium succinate Injectable 40 milliGRAM(s) IV Push every 6 hours    guaiFENesin    Syrup 200 milliGRAM(s) Oral every 6 hours PRN    oxyCODONE    5 mG/acetaminophen 325 mG 2 Tablet(s) Oral every 4 hours PRN  ALPRAZolam 0.25 milliGRAM(s) Oral two times a day PRN  morphine  - Injectable 1 milliGRAM(s) IV Push once          dextrose 5%. 1000 milliLiter(s) IV Continuous <Continuous>  calcium gluconate IVPB 1 Gram(s) IV Intermittent once            LABS:                        10.8   19.0  )-----------( 61       ( 07 Sep 2017 04:24 )             31.2     Hgb Trend: 10.8<--, 10.4<--, 10.4<--, 10.9<--, 11.8<--  09-07    131<L>  |  101  |  41<H>  ----------------------------<  170<H>  6.0<H>   |  11<L>  |  1.40<H>    Ca    7.1<L>      07 Sep 2017 04:24    TPro  6.6  /  Alb  3.1<L>  /  TBili  0.5  /  DBili  x   /  AST  40  /  ALT  21  /  AlkPhos  188<H>  09-07    Creatinine Trend: 1.40<--, 1.12<--, 0.91<--, 0.95<--, 1.15<--, 1.45<--  PT/INR - ( 07 Sep 2017 04:24 )   PT: 14.6 sec;   INR: 1.33 ratio         PTT - ( 07 Sep 2017 04:24 )  PTT:28.9 sec    Arterial Blood Gas:  09-07 @ 04:27  7.42/19/61/12/89/-10.7  ABG lactate: --  Arterial Blood Gas:  09-07 @ 00:19  7.45/22/72/15/94/-7.1  ABG lactate: --  Arterial Blood Gas:  09-06 @ 21:09  7.49/17/45/13/79/-8.6  ABG lactate: --  Arterial Blood Gas:  09-05 @ 21:58  7.42/22/82/14/96/-9.3  ABG lactate: --  Arterial Blood Gas:  09-05 @ 09:27  7.45/24/59/16/90/-6.3  ABG lactate: --        MICROBIOLOGY:     RADIOLOGY:  [ ] Reviewed and interpreted by me    EKG:

## 2017-09-07 NOTE — PROGRESS NOTE ADULT - ATTENDING COMMENTS
In the future I'd be happy to offer input regarding antibiotic selection prior to modifications.    Onur Bautista MD  [phone]
Critically ill with guarded outlook  >45 min total time spent.    Onur Bautista MD  [phone]

## 2017-09-07 NOTE — CONSULT NOTE ADULT - ASSESSMENT
57 year old man with metastatic NSCLC adenocarcinoma w/ bone mets  (diagnosed in Nov.) s/p chemotherapy, now on immunotherapy-Keytruda, heavy smoker, pulmonary emboli (failed on pradaxa) now on lovenox, initially presented w/ SOB admitted w/ hypoxic respiratory failure, h/c course complicated by worsening respiratory distress, concerning for PCP Pna vs immunotherapy toxicity    Neuro:  Respiratory:  Cardiovascular:  GI: 57 year old man with metastatic NSCLC adenocarcinoma w/ bone mets  (diagnosed in Nov.) s/p chemotherapy, now on immunotherapy-Keytruda, heavy smoker, pulmonary emboli (failed on pradaxa) now on lovenox, initially presented w/ SOB admitted w/ hypoxic respiratory failure, h/c course complicated by worsening respiratory distress, concerning for PCP Pna vs immunotherapy toxicity. MICU CONSULTED FOR TACHYPNEA. PATIENT ACCEPTED TO MICU.    #Neuro:  - alert and oriented x 4    #Resp:  -Hypoxic respiratory failure 2/2 to chemo toxicity vs possible PCP PNA, as evidenced by CTA showing persistent Right hilar/RUL mass, increased GOO  -tachypnea due to air hunger, respiratory compensation in setting of bicarb of 11  -Continue w/ BIPAP, ABG  -will need bicarb pushes to increase bicarb to drive tachypnea down  -morphine 1mg q8 prn for anxiety/tachypnea to help w/ air hunger  -f/u pulm recs    #CV:  -hemodynamically stable, not requiring pressors, tachycardia in setting of anxiety    #GI:  - NPO on BIPAP    # Renal:  -HCO3 13, will need bicarb pushes  -urine lytes  -monitor urine output  -will give bicarb  -hyperkalemia in setting of DALILA and bactrim, will need to closely monitor    #ID:  -Hypoxic respiratory failure 2/2 to chemo toxicity vs possible PCP PNA  -BCx NGTD 9/4, will repeat BCx, U/A  -Legionella negative, RVP negative  -c/w vanc, zosyn, bactrim  -fungitell pending  -appreciate ID recs    #PPx:  - Lovenox BID 57 year old man with metastatic NSCLC adenocarcinoma w/ bone mets  (diagnosed in Nov.) s/p chemotherapy, now on immunotherapy-Keytruda, heavy smoker, pulmonary emboli (failed on pradaxa) now on lovenox, initially presented w/ SOB admitted w/ hypoxic respiratory failure, h/c course complicated by worsening respiratory distress, concerning for PCP Pna vs immunotherapy toxicity. MICU CONSULTED FOR TACHYPNEA. PATIENT ACCEPTED TO MICU.    #Neuro:  - alert and oriented x 4    #Resp:  -Hypoxic respiratory failure 2/2 to chemo toxicity vs possible PCP PNA, as evidenced by CTA showing persistent Right hilar/RUL mass, increased GOO  -tachypnea due to air hunger, respiratory compensation in setting of bicarb of 11  -Continue w/ BIPAP, ABG  -will need bicarb pushes to increase bicarb to drive tachypnea down  -morphine 1mg q8 prn for anxiety/tachypnea to help w/ air hunger  -f/u pulm recs    #CV:  -hemodynamically stable, not requiring pressors, tachycardia in setting of anxiety    #GI:  - NPO on BIPAP    # Renal:  -HCO3 13, will need bicarb pushes  -urine lytes  -monitor urine output  -will give bicarb  -hyperkalemia in setting of DALILA and bactrim, will need to closely monitor    #ID:  -Hypoxic respiratory failure 2/2 to chemo toxicity vs possible PCP PNA  -BCx NGTD 9/4, will repeat BCx, U/A  -Legionella negative, RVP negative  -c/w vanc, zosyn, bactrim, solumedrol w/ ISS and FGs q6  -fungitell pending  -appreciate ID recs    #PPx:  - Lovenox BID

## 2017-09-07 NOTE — CHART NOTE - NSCHARTNOTEFT_GEN_A_CORE
RRT called for pt tachypneic to 30's and desatting RRT called for pt tachypneic to 30's and desatting to 85% on Tele. Pt was evaluated. Noted to be tachypneic, RRT called for pt tachypneic to 30's and desatting to 85% on Tele. Pt was evaluated. Noted to be tachypneic, using acessory muscles for breathing, on BiPap. Pt denies CP/ Palpitations/ Diaphoresis. HA/ Dizziness/ Blurry vision/ syncope, fever/ chills, Abdominal Pain/ N/V/D/C.     Vital Signs Last 24 Hrs  T(C): 36.7 (06 Sep 2017 20:45), Max: 36.7 (06 Sep 2017 12:24)  T(F): 98 (06 Sep 2017 20:45), Max: 98 (06 Sep 2017 12:24)  HR: 117 (07 Sep 2017 04:07) (88 - 117)  BP: 121/65 (07 Sep 2017 04:07) (115/72 - 121/65)  BP(mean): --  RR: 18 (06 Sep 2017 20:45) (18 - 19)  SpO2: 94% (06 Sep 2017 23:02) (90% - 94%)                          10.8   19.0  )-----------( 61       ( 07 Sep 2017 04:24 )             31.2         131<L>  |  101  |  41<H>  ----------------------------<  170<H>  6.0<H>   |  11<L>  |  1.40<H>    Ca    7.1<L>      07 Sep 2017 04:24    TPro  6.6  /  Alb  3.1<L>  /  TBili  0.5  /  DBili  x   /  AST  40  /  ALT  21  /  AlkPhos  188<H>      PT/INR - ( 07 Sep 2017 04:24 )   PT: 14.6 sec;   INR: 1.33 ratio         PTT - ( 07 Sep 2017 04:24 )  PTT:28.9 sec    CARDIAC MARKERS ( 07 Sep 2017 04:24 )  x     / <0.01 ng/mL / 157 U/L / x     / 4.9 ng/mL      Physical Exam.   General: On BiPAP, Tachypneic to 30's, using accessory muscles for breathing.   HEENT: NC/AT.   Neck: No JVD.   Lungs: Bibasilar crackles, no wheezing noted, scattered rhonchi.   Cardiac: +S1/S2, RRR, no m/g/t.   GI: Soft, NT, ND, + Normoactive BS in all 4 quadrants.  Neuro: A&O X3, non- focal.   Skin: Multiple ecchymoses lower abd area 2' lovenox.   Vascular: 2+ pulses in all UE and LE.   Extremities: No clubbing/ cyanosis / edema.     A/P: 57M with hx of stage IV lung CA on immunotherapy (Nic). Pt diagnosed 2016, previously on chemo. Hx of PE with failed therapy anticoagulants, now on Lovenox 120 mg BID.. Pt with SOB x 1 week, progressively worsening. Pt saw your Hem/Onc on Tuesday who noted b/l LE edema likely 2/2 4 mg TID. Per wife, Pt had fever 100.2 yesterday & has had hemoptysis but has had epistaxis. Abdominal ecchymosis 2/2 lovenox with no recent trauma, admitted with Hypoxic Respiratory Failure 2/2 HCAP on Vano and Zosyn, s/p RRT on  for Tachypnea, SpO2: 85-88% on BiPAP and Tachycardia.     Acute Hypoxic RF 2/2 HCAP/ PE/ NSCLC.   - During RRT Lasix 40IVP x1 given.   - AB.42/19/61/12.   - CXR: [Prelim]: RUL Opacification,   - Calcium Gluconate 1gm x1.   - Morphine 1mg IVP x1 to help breathing.   - c/w BiPAP 12/5 @ FiO2: 100%.   -  c/w Vanco and Zosyn for HCAP.   - c/w Lovenox.  - c/w SoluMedrol.   - MICU consulted, transfer to MICU for further treatment.   - f/u Pulmo recommendations.   - Will endorse to primary team in am .     JOCE. MALENA TREJO  # [id number]  Medicine PA. RRT called for pt tachypneic to 30's and desatting to 85%, Tachycardic to 120's  on Tele. Pt was evaluated. Noted to be tachypneic, using acessory muscles for breathing, on BiPap. Pt denies CP/ Palpitations/ Diaphoresis. HA/ Dizziness/ Blurry vision/ syncope, fever/ chills, Abdominal Pain/ N/V/D/C.     Vital Signs Last 24 Hrs  T(C): 36.7 (06 Sep 2017 20:45), Max: 36.7 (06 Sep 2017 12:24)  T(F): 98 (06 Sep 2017 20:45), Max: 98 (06 Sep 2017 12:24)  HR: 117 (07 Sep 2017 04:07) (88 - 117)  BP: 121/65 (07 Sep 2017 04:07) (115/72 - 121/65)  BP(mean): --  RR: 18 (06 Sep 2017 20:45) (18 - 19)  SpO2: 94% (06 Sep 2017 23:02) (90% - 94%)                          10.8   19.0  )-----------( 61       ( 07 Sep 2017 04:24 )             31.2         131<L>  |  101  |  41<H>  ----------------------------<  170<H>  6.0<H>   |  11<L>  |  1.40<H>    Ca    7.1<L>      07 Sep 2017 04:24    TPro  6.6  /  Alb  3.1<L>  /  TBili  0.5  /  DBili  x   /  AST  40  /  ALT  21  /  AlkPhos  188<H>      PT/INR - ( 07 Sep 2017 04:24 )   PT: 14.6 sec;   INR: 1.33 ratio         PTT - ( 07 Sep 2017 04:24 )  PTT:28.9 sec    CARDIAC MARKERS ( 07 Sep 2017 04:24 )  x     / <0.01 ng/mL / 157 U/L / x     / 4.9 ng/mL      Physical Exam.   General: On BiPAP, Tachypneic to 30's, using accessory muscles for breathing.   HEENT: NC/AT.   Neck: No JVD.   Lungs: Bibasilar crackles, no wheezing noted, scattered rhonchi.   Cardiac: +S1/S2, RRR, no m/g/t.   GI: Soft, NT, ND, + Normoactive BS in all 4 quadrants.  Neuro: A&O X3, non- focal.   Skin: Multiple ecchymoses lower abd area 2' lovenox.   Vascular: 2+ pulses in all UE and LE.   Extremities: No clubbing/ cyanosis / edema.     A/P: 57M with hx of stage IV lung CA on immunotherapy (Nic). Pt diagnosed 2016, previously on chemo. Hx of PE with failed therapy anticoagulants, now on Lovenox 120 mg BID.. Pt with SOB x 1 week, progressively worsening. Pt saw your Hem/Onc on Tuesday who noted b/l LE edema likely 2/2 4 mg TID. Per wife, Pt had fever 100.2 yesterday & has had hemoptysis but has had epistaxis. Abdominal ecchymosis 2/2 lovenox with no recent trauma, admitted with Hypoxic Respiratory Failure 2/2 HCAP on Vano and Zosyn, s/p RRT on  for Tachypnea, SpO2: 85-88% on BiPAP and Tachycardia.     Acute Hypoxic RF 2/2 HCAP/ PE/ NSCLC.   - During RRT Lasix 40IVP x1 given.   - AB.42/19/61/12.   - CXR: [Prelim]: RUL Opacification,   - Calcium Gluconate 1gm x1.   - Morphine 1mg IVP x1 to help breathing.   - c/w BiPAP 12/5 @ FiO2: 100%.   -  c/w Vanco and Zosyn for HCAP.   - c/w Lovenox.  - c/w SoluMedrol.   - MICU consulted, transfer to MICU for further treatment.   - f/u Pulmo recommendations.   - Will endorse to primary team in am .     JOCE. MALENA TREJO  # [id number]  Medicine PA.

## 2017-09-07 NOTE — CHART NOTE - NSCHARTNOTEFT_GEN_A_CORE
RRT note in progress RRT called for hypoxia (85%) & tachypnea (30s) while on BiPAP.  Pt admitted w/ acute hypoxemic respiratory failure 2/2 PNA (?PCP) vs. chemo-related pneumonitis & is being treated empirically for both. Per PA, pt was placed on BiPAP about 3-4hrs ago for hypoxia & tachypnea w/ little improvement. During RRT, O2 varied between high 80s & low 90s. Previous CXR showed pulm edema & inspiratory crackles appreciated on exam by [facility] intern, so lasix 40mg given. Given sustained WOB on BiPAP, PA advised to call MICU regarding closer monitoring & possible need for intubation for progression.     [place] [id number]

## 2017-09-07 NOTE — PROGRESS NOTE ADULT - PROBLEM SELECTOR PROBLEM 3
Other chronic pulmonary embolism

## 2017-09-07 NOTE — PROGRESS NOTE ADULT - PROBLEM SELECTOR PLAN 4
Transfuse if plts < 10K or < 20K with acute bleeding
Transfuse if plts < 10K or < 20K with acute bleeding
Within DDx.  LDH of no value here, will be elevated  Will check Fungitell which can be elevated in PCP  Recommendations as above.
Within DDx.  LDH of no value here, will be elevated  Will check Fungitell which can be elevated in PCP  Will change bactrim to PO dinimish fluid load

## 2017-09-07 NOTE — PROGRESS NOTE ADULT - PROBLEM SELECTOR PLAN 5
Off atypical coverage as legionella negative  Would recheck Vanco trough, target 15-20  Cont Zosyn for now, doubt Na Load contributing to hypoxemia
Off atypical coverage as legionella negative  Recheck Vanco trough, target 15-20  Cont Zosyn for now, doubt Na Load contributing to hypoxemia

## 2017-09-07 NOTE — PROGRESS NOTE ADULT - SUBJECTIVE AND OBJECTIVE BOX
[facility] Hematology/Oncology - Ian Lucero / Esau / Cristóbal - [phone]  Primary Outpatient Hematologist/Oncologist: Dr. Brien Lucero    Subjective  Patient seen and examined. Lying in bed. HiFo Nasal Cannula No significant changes in shortness of breathe. Perhaps minimal improvement as per patient. No fevers or chills. No pain. +Cough.     Admitting Complaint/History  HPI:  57 year old man followed by Dr. Shaffer and Dr. 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  Patient now on BiPAP in the ER with improvement of symptoms.  NO hemoptysis.  NO HA, no focal weakness.  No chest pain/pressure.  No abdominal pain, no red blood per rectum or melena.   No back pain.  No tearing  back pain.  NO hematuria.  Patient with anorexia and involuntary weight loss.  Remaining review of systems not contributory. (04 Sep 2017 02:42)    ROS:  General:  (-)Pain, (-)Decrease appetite, (-)Fevers, (-)Chills, (-)Weight Loss  Eyes: (-)Blurry Vision, (-)Double Vision, (-)Vision Loss  ENT: (-)Sinus Congestion, (-)Decreased Hearing, (-)Nosebleeds, (-)Sore Throat  Cardiac: (-)Chest Pain, (-)Palpitations, (+)Shortness of breathe on exertion  Respiratory: (-)Cough, (-)hemoptysis, (+)Shortness of breathe at rest  GI: (-)Nausea, (-)Vomiting, (-)Diarrhea, (-)Constipation, (-)Melena, (-)BRBPR  : (-)Hematuria, (-)Dysuria, (-)Polyuia  MSK: (-)Back pain, (-)Joint pain, (-)Stiffness  Dermatology: (-)Rash, (-)Itching  Neurology:  (-)Numbness, (-)Tingling, (+Difficulty Walking, (-)Tremors, (+)Weakness  Psych: (-)Anxiety, (-)Depression, (-)Memory Loss  Hematology:  (-)Easy Bruising, (-)Easy Bleeding, (-)Night Sweats.     Objective  Vital Signs Last 24 Hrs  T(C): 36.8 (06 Sep 2017 03:58), Max: 37 (05 Sep 2017 11:54)  T(F): 98.3 (06 Sep 2017 03:58), Max: 98.6 (05 Sep 2017 11:54)  HR: 110 (06 Sep 2017 05:26) (93 - 130)  BP: 126/76 (06 Sep 2017 05:26) (102/78 - 135/94)  RR: 20 (06 Sep 2017 05:26) (18 - 25)  SpO2: 94% (06 Sep 2017 05:26) (91% - 97%)    Physical Exam:  General: AAO x 3. NAD. Sitting in the bed. HiFlo Nasal Cannula. Slightly uncomfortable.  HEENT: clear oropharynx, anicteric sclera, pink conjunctivae, EOMi. PERRLA  Cardiac: S1, S2 present. No audible murmurs. RRR  Lungs: B/L rhonchi  Abdomen: Soft, Non-Tender, Non-Distended. No palpable hepatosplenomegaly  Extremities: 2+ pulses. No edema  Skin: No Rashes. No petechiae  Neuro: No focal motor or sensory deficits.     Medications:  MEDICATIONS  (STANDING):  enoxaparin Injectable 120 milliGRAM(s) SubCutaneous every 12 hours  piperacillin/tazobactam IVPB. 3.375 Gram(s) IV Intermittent every 8 hours  insulin lispro (HumaLOG) corrective regimen sliding scale   SubCutaneous every 6 hours  dextrose 5%. 1000 milliLiter(s) (50 mL/Hr) IV Continuous <Continuous>  dextrose 50% Injectable 12.5 Gram(s) IV Push once  dextrose 50% Injectable 25 Gram(s) IV Push once  dextrose 50% Injectable 25 Gram(s) IV Push once  trimethoprim / sulfamethoxazole IVPB 600 milliGRAM(s) IV Intermittent every 8 hours  methylPREDNISolone sodium succinate Injectable 40 milliGRAM(s) IV Push every 6 hours  vancomycin  IVPB 1250 milliGRAM(s) IV Intermittent every 12 hours    Labs:                          10.4   8.74  )-----------( 61       ( 05 Sep 2017 07:33 )             32.7   09-05    137  |  106  |  30<H>  ----------------------------<  122<H>  4.6   |  17<L>  |  0.91    Ca    6.9<L>      05 Sep 2017 13:07    Radiology:  CT Chest 9/4/2017  IMPRESSION:     Suboptimal evaluation of the peripheral pulmonary arteries. Overall   decreased extent of previously noted emboli with residual segmental   pulmonary emboli as described. No central pulmonary embolus.    Persistent right hilar/right upper lobe mass, which is similar in   appearance to 12/19/2016 and is in keeping with patient's known   malignancy.    Significantly increased bilateral pulmonary groundglass opacities, which   may represent pneumonia although additional metastasis cannot be   excluded. Recommend follow-up to assess resolution following completion   of treatment.    Increased extent of osseous metastasis since 12/19/2016.
[facility] Hematology/Oncology - Ian Lucero / Esau / Cristóbal - [phone]  Primary Outpatient Hematologist/Oncologist: Dr. Brien Lucero    Subjective  Patient seen and examined. Lying in bed. More comfortable on BIPAP but still SOB on exertion. Accepted to MICU overnight.     Admitting Complaint/History  HPI:  57 year old man followed by Dr. Shaffer and Dr. 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  Patient now on BiPAP in the ER with improvement of symptoms.  NO hemoptysis.  NO HA, no focal weakness.  No chest pain/pressure.  No abdominal pain, no red blood per rectum or melena.   No back pain.  No tearing  back pain.  NO hematuria.  Patient with anorexia and involuntary weight loss.  Remaining review of systems not contributory. (04 Sep 2017 02:42)    ROS:  General:  (-)Pain, (-)Decrease appetite, (-)Fevers, (-)Chills, (-)Weight Loss  Eyes: (-)Blurry Vision, (-)Double Vision, (-)Vision Loss  ENT: (-)Sinus Congestion, (-)Decreased Hearing, (-)Nosebleeds, (-)Sore Throat  Cardiac: (-)Chest Pain, (-)Palpitations, (+)Shortness of breathe on exertion  Respiratory: (-)Cough, (-)hemoptysis, (+)Shortness of breathe at rest  GI: (-)Nausea, (-)Vomiting, (-)Diarrhea, (-)Constipation, (-)Melena, (-)BRBPR  : (-)Hematuria, (-)Dysuria, (-)Polyuia  MSK: (-)Back pain, (-)Joint pain, (-)Stiffness  Dermatology: (-)Rash, (-)Itching  Neurology:  (-)Numbness, (-)Tingling, (+Difficulty Walking, (-)Tremors, (+)Weakness  Psych: (-)Anxiety, (-)Depression, (-)Memory Loss  Hematology:  (-)Easy Bruising, (-)Easy Bleeding, (-)Night Sweats.     Objective  Vital Signs Last 24 Hrs  T(C): 36.7 (07 Sep 2017 07:29), Max: 36.7 (06 Sep 2017 12:24)  T(F): 98 (07 Sep 2017 07:29), Max: 98 (06 Sep 2017 12:24)  HR: 107 (07 Sep 2017 08:00) (93 - 119)  BP: 103/61 (07 Sep 2017 08:00) (103/61 - 121/93)  BP(mean): 76 (07 Sep 2017 08:00) (76 - 104)  RR: 25 (07 Sep 2017 08:00) (18 - 31)  SpO2: 98% (07 Sep 2017 08:00) (90% - 98%)    Physical Exam:  General: AAO x 3. NAD. Sitting in the bed. HiFlo Nasal Cannula. Slightly uncomfortable.  HEENT: clear oropharynx, anicteric sclera, pink conjunctivae, EOMi. PERRLA  Cardiac: S1, S2 present. No audible murmurs. RRR  Lungs: B/L rhonchi  Abdomen: Soft, Non-Tender, Non-Distended. No palpable hepatosplenomegaly  Extremities: 2+ pulses. No edema  Skin: No Rashes. No petechiae  Neuro: No focal motor or sensory deficits.     Medications:  MEDICATIONS  (STANDING):  enoxaparin Injectable 120 milliGRAM(s) SubCutaneous every 12 hours  nicotine - 21 mG/24Hr(s) Patch 1 patch Transdermal daily  insulin lispro (HumaLOG) corrective regimen sliding scale   SubCutaneous every 6 hours  dextrose 5%. 1000 milliLiter(s) (50 mL/Hr) IV Continuous <Continuous>  dextrose 50% Injectable 12.5 Gram(s) IV Push once  dextrose 50% Injectable 25 Gram(s) IV Push once  dextrose 50% Injectable 25 Gram(s) IV Push once  dextrose 5% 1000 milliLiter(s) (75 mL/Hr) IV Continuous <Continuous>  piperacillin/tazobactam IVPB. 3.375 Gram(s) IV Intermittent every 8 hours  vancomycin  IVPB 1250 milliGRAM(s) IV Intermittent every 12 hours  methylPREDNISolone sodium succinate Injectable 40 milliGRAM(s) IV Push every 6 hours  atovaquone Suspension 750 milliGRAM(s) Oral two times a day    Labs:                        10.8   19.0  )-----------( 61       ( 07 Sep 2017 04:24 )             31.2     131<L>  |  101  |  41<H>  ----------------------------<  170<H>  6.0<H>   |  11<L>  |  1.40<H>    Ca    7.1<L>      07 Sep 2017 04:24    TPro  6.6  /  Alb  3.1<L>  /  TBili  0.5  /  DBili  x   /  AST  40  /  ALT  21  /  AlkPhos  188<H>  09-07      Radiology:  CT Chest 9/4/2017  IMPRESSION:     Suboptimal evaluation of the peripheral pulmonary arteries. Overall   decreased extent of previously noted emboli with residual segmental   pulmonary emboli as described. No central pulmonary embolus.    Persistent right hilar/right upper lobe mass, which is similar in   appearance to 12/19/2016 and is in keeping with patient's known   malignancy.    Significantly increased bilateral pulmonary groundglass opacities, which   may represent pneumonia although additional metastasis cannot be   excluded. Recommend follow-up to assess resolution following completion   of treatment.    Increased extent of osseous metastasis since 12/19/2016.
Follow-up Pulm Progress Note  Daniel Manriquez MD  [phone]    AFebrile on Vanco, Zosyn, Bactrim  Tolerating HighFlow cannula, now off BIPAP: states he is comfortable.   O2 sat on 100% hiflo 96-97%    Medications:  Vital Signs Last 24 Hrs  T(C): 36.5 (05 Sep 2017 04:05), Max: 37 (04 Sep 2017 15:23)  T(F): 97.7 (05 Sep 2017 04:05), Max: 98.6 (04 Sep 2017 15:23)  HR: 102 (05 Sep 2017 09:28) (94 - 120)  BP: 114/78 (05 Sep 2017 04:05) (100/64 - 115/73)  BP(mean): --  RR: 20 (05 Sep 2017 04:05) (20 - 22)  SpO2: 97% (05 Sep 2017 09:28) (94% - 100%)  ABG - ( 05 Sep 2017 09:27 )  pH: 7.45  /  pCO2: 24    /  pO2: 59    / HCO3: 16    / Base Excess: -6.3  /  SaO2: 90          09-04 @ 07:01  -  09-05 @ 07:00  --------------------------------------------------------  IN: 1320 mL / OUT: 1500 mL / NET: -180 mL    LABS:                        10.4   8.74  )-----------( 61       ( 05 Sep 2017 07:33 )             32.7     09-05    139  |  106  |  32<H>  ----------------------------<  136<H>  5.8<H>   |  19<L>  |  0.95    Ca    7.6<L>      05 Sep 2017 07:21    TPro  7.4  /  Alb  3.9  /  TBili  1.3<H>  /  DBili  x   /  AST  36  /  ALT  27  /  AlkPhos  222<H>  09-03      PT/INR - ( 05 Sep 2017 07:33 )   PT: 13.6 sec;   INR: 1.20 ratio         PTT - ( 05 Sep 2017 07:33 )  PTT:28.5 sec    Serum Pro-Brain Natriuretic Peptide: 4160 pg/mL (09-03-17 @ 19:24)      CULTURES:        Physical Examination:  PULM: WIthout wheeze or crackle; distant  CVS: Regular rate and rhythm, no murmurs, rubs, or gallops  ABD: Soft, non-tender  EXT:  trace- 1+ pedal edema    RADIOLOGY REVIEWED  CXR: pending    CT chest: see consult    TTE:
Follow-up Pulm Progress Note  Daniel Manriquez MD  [phone]    Notes reviewed  AFebrile on Vanco, Zosyn, Bactrim  Remains on hiflo at 97% FIO2 with oxygen sat 91%  Refuses BIPAP  still c/o dyspnea with minimal motion/activity    Medications:  Vital Signs Last 24 Hrs  T(C): 36.5 (05 Sep 2017 04:05), Max: 37 (04 Sep 2017 15:23)  T(F): 97.7 (05 Sep 2017 04:05), Max: 98.6 (04 Sep 2017 15:23)  HR: 102 (05 Sep 2017 09:28) (94 - 120)  BP: 114/78 (05 Sep 2017 04:05) (100/64 - 115/73)  BP(mean): --  RR: 20 (05 Sep 2017 04:05) (20 - 22)  SpO2: 97% (05 Sep 2017 09:28) (94% - 100%)  ABG - ( 05 Sep 2017 09:27 )  pH: 7.45  /  pCO2: 24    /  pO2: 59    / HCO3: 16    / Base Excess: -6.3  /  SaO2: 90          09-04 @ 07:01  -  09-05 @ 07:00  --------------------------------------------------------  IN: 1320 mL / OUT: 1500 mL / NET: -180 mL    LABS:                        10.4   8.74  )-----------( 61       ( 05 Sep 2017 07:33 )             32.7     09-05    139  |  106  |  32<H>  ----------------------------<  136<H>  5.8<H>   |  19<L>  |  0.95    Ca    7.6<L>      05 Sep 2017 07:21    TPro  7.4  /  Alb  3.9  /  TBili  1.3<H>  /  DBili  x   /  AST  36  /  ALT  27  /  AlkPhos  222<H>  09-03      PT/INR - ( 05 Sep 2017 07:33 )   PT: 13.6 sec;   INR: 1.20 ratio         PTT - ( 05 Sep 2017 07:33 )  PTT:28.5 sec    Serum Pro-Brain Natriuretic Peptide: 4160 pg/mL (09-03-17 @ 19:24)      CULTURES:        Physical Examination:  PULM: WIthout wheeze or crackle; distant  CVS: Regular rate and rhythm, no murmurs, rubs, or gallops  ABD: Soft, non-tender  EXT:  trace- 1+ pedal edema    RADIOLOGY REVIEWED  CXR: pending    CT chest: see consult    TTE:
Follow-up Pulm Progress Note  Daniel Manriquez MD  [phone]    Notes noted: s/p RRT for dyspnea with low bicarb and resp alkalsosis. Incr creatinine noted: likely secondary to dehydration - pt got oral lasix 20 mg X2 since admission  AFebrile on Vanco, Zosyn, Bactrim  Still refuses BIPAP - remains tachypneic on hiflo at 80%  Bactrim DC's for Mepron by MICU  CXR: no change - diffuse opacities, R predmoniant. Borderline improvement on L    Medications:  Vital Signs Last 24 Hrs  T(C): 36.5 (05 Sep 2017 04:05), Max: 37 (04 Sep 2017 15:23)  T(F): 97.7 (05 Sep 2017 04:05), Max: 98.6 (04 Sep 2017 15:23)  HR: 102 (05 Sep 2017 09:28) (94 - 120)  BP: 114/78 (05 Sep 2017 04:05) (100/64 - 115/73)  BP(mean): --  RR: 20 (05 Sep 2017 04:05) (20 - 22)  SpO2: 97% (05 Sep 2017 09:28) (94% - 100%)  ABG - ( 05 Sep 2017 09:27 )  pH: 7.45  /  pCO2: 24    /  pO2: 59    / HCO3: 16    / Base Excess: -6.3  /  SaO2: 90          09-04 @ 07:01  -  09-05 @ 07:00  --------------------------------------------------------  IN: 1320 mL / OUT: 1500 mL / NET: -180 mL    LABS:                        10.4   8.74  )-----------( 61       ( 05 Sep 2017 07:33 )             32.7     09-05    139  |  106  |  32<H>  ----------------------------<  136<H>  5.8<H>   |  19<L>  |  0.95    Ca    7.6<L>      05 Sep 2017 07:21    TPro  7.4  /  Alb  3.9  /  TBili  1.3<H>  /  DBili  x   /  AST  36  /  ALT  27  /  AlkPhos  222<H>  09-03      PT/INR - ( 05 Sep 2017 07:33 )   PT: 13.6 sec;   INR: 1.20 ratio         PTT - ( 05 Sep 2017 07:33 )  PTT:28.5 sec    Serum Pro-Brain Natriuretic Peptide: 4160 pg/mL (09-03-17 @ 19:24)      CULTURES:        Physical Examination:  PULM: WIthout wheeze or crackle; distant  CVS: Regular rate and rhythm, no murmurs, rubs, or gallops  ABD: Soft, non-tender  EXT:  trace- 1+ pedal edema    RADIOLOGY REVIEWED  CXR:  see above  CT chest: see consult    TTE:
HEALTH ISSUES - PROBLEM Dx:  Thrombocytopenia: Thrombocytopenia  Other chronic pulmonary embolism: Other chronic pulmonary embolism  Non-small cell carcinoma of lung: Non-small cell carcinoma of lung  Acute respiratory failure with hypoxemia: Acute respiratory failure with hypoxemia          CHEMOTHERAPY REGIMEN:        Day:                          Diet:  Protocol:                                    IVF:    MEDICATIONS  (STANDING):  dexamethasone     Tablet 4 milliGRAM(s) Oral three times a day  enoxaparin Injectable 120 milliGRAM(s) SubCutaneous every 12 hours  ALBUTerol/ipratropium for Nebulization 3 milliLiter(s) Nebulizer every 4 hours  piperacillin/tazobactam IVPB. 3.375 Gram(s) IV Intermittent every 8 hours  vancomycin  IVPB   IV Intermittent   insulin lispro (HumaLOG) corrective regimen sliding scale   SubCutaneous every 6 hours  dextrose 5%. 1000 milliLiter(s) (50 mL/Hr) IV Continuous <Continuous>  dextrose 50% Injectable 12.5 Gram(s) IV Push once  dextrose 50% Injectable 25 Gram(s) IV Push once  dextrose 50% Injectable 25 Gram(s) IV Push once  vancomycin  IVPB 1000 milliGRAM(s) IV Intermittent every 12 hours  sodium chloride 0.9%. 1000 milliLiter(s) (80 mL/Hr) IV Continuous <Continuous>  methylPREDNISolone sodium succinate Injectable 40 milliGRAM(s) IV Push every 8 hours    MEDICATIONS  (PRN):  oxyCODONE    5 mG/acetaminophen 325 mG 2 Tablet(s) Oral every 4 hours PRN Moderate Pain (4 - 6)  dextrose Gel 1 Dose(s) Oral once PRN Blood Glucose LESS THAN 70 milliGRAM(s)/deciliter  glucagon  Injectable 1 milliGRAM(s) IntraMuscular once PRN Glucose LESS THAN 70 milligrams/deciliter      Vital Signs Last 24 Hrs  T(C): 37 (04 Sep 2017 15:23), Max: 37 (04 Sep 2017 15:23)  T(F): 98.6 (04 Sep 2017 15:23), Max: 98.6 (04 Sep 2017 15:23)  HR: 102 (04 Sep 2017 15:23) (90 - 126)  BP: 114/78 (04 Sep 2017 15:23) (104/45 - 118/81)  BP(mean): --  RR: 20 (04 Sep 2017 15:23) (18 - 26)  SpO2: 100% (04 Sep 2017 15:23) (88% - 100%)    PHYSICAL EXAM:      Constitutional:    Eyes:    ENMT:    Neck:    Breasts:    Back:    Respiratory:    Cardiovascular:    Gastrointestinal:    Genitourinary:    Rectal:    Extremities:    Vascular:    Neurological:    Skin:    Lymph Nodes:    Musculoskeletal:    Psychiatric:        LABS:  CBC Full  -  ( 04 Sep 2017 08:09 )  WBC Count : 8.24 K/uL  Hemoglobin : 10.9 g/dL  Hematocrit : 33.6 %  Platelet Count - Automated : 56 K/uL  Mean Cell Volume : 99.4 fl  Mean Cell Hemoglobin : 32.2 pg  Mean Cell Hemoglobin Concentration : 32.4 gm/dL  Auto Neutrophil # : 5.93 K/uL  Auto Lymphocyte # : 1.40 K/uL  Auto Monocyte # : 0.25 K/uL  Auto Eosinophil # : 0.08 K/uL  Auto Basophil # : 0.08 K/uL  Auto Neutrophil % : 69.0 %  Auto Lymphocyte % : 17.0 %  Auto Monocyte % : 3.0 %  Auto Eosinophil % : 1.0 %  Auto Basophil % : 1.0 %    09-04    141  |  105  |  39<H>  ----------------------------<  98  4.8   |  18<L>  |  1.15    Ca    7.9<L>      04 Sep 2017 01:59    TPro  7.4  /  Alb  3.9  /  TBili  1.3<H>  /  DBili  x   /  AST  36  /  ALT  27  /  AlkPhos  222<H>  09-03    PT/INR - ( 04 Sep 2017 08:09 )   PT: 13.5 sec;   INR: 1.19 ratio         PTT - ( 04 Sep 2017 08:09 )  PTT:29.1 sec  Urinalysis Basic - ( 04 Sep 2017 09:49 )    Color: Yellow / Appearance: Clear / SG: =1.051 / pH: x  Gluc: x / Ketone: Negative  / Bili: Negative / Urobili: Negative   Blood: x / Protein: 100 / Nitrite: Negative   Leuk Esterase: Negative / RBC: 4 /HPF / WBC 5 /HPF   Sq Epi: x / Non Sq Epi: 3 /HPF / Bacteria: Negative        RADIOLOGY & ADDITIONAL STUDIES:ct
Interval HPI/Events:    REVIEW OF SYSTEMS:  Constitutional: [x] negative [ ] fevers [ ] chills [ ] weight loss [ ] weight gain  HEENT: [x] negative [ ] dry eyes [ ] eye irritation [ ] postnasal drip [ ] nasal congestion  CV: [x] negative  [ ] chest pain [ ] orthopnea [ ] palpitations [ ] murmur  Resp: [ ] negative [ ] cough [x] shortness of breath [x] dyspnea [ ] wheezing [ ] sputum [ ] hemoptysis  GI: [x] negative [ ] nausea [ ] vomiting [ ] diarrhea [ ] constipation [ ] abd pain [ ] dysphagia   : [x] negative [ ] dysuria [ ] nocturia [ ] hematuria [ ] increased urinary frequency  Musculoskeletal: [x] negative [ ] back pain [ ] myalgias [ ] arthralgias [ ] fracture  Skin: [x] negative [ ] rash [ ] itch  Neurological: [x] negative [ ] headache [ ] dizziness [ ] syncope [ ] weakness [ ] numbness  Psychiatric: [ ] negative [ ] anxiety [ ] depression  Endocrine: [ ] negative [ ] diabetes [ ] thyroid problem  Hematologic/Lymphatic: [ ] negative [ ] anemia [ ] bleeding problem  Allergic/Immunologic: [ ] negative [ ] itchy eyes [ ] nasal discharge [ ] hives [ ] angioedema  [ ] All other systems negative  [ ] Unable to assess ROS because ________    OBJECTIVE:  ICU Vital Signs Last 24 Hrs  T(C): 36.7 (07 Sep 2017 12:00), Max: 36.7 (06 Sep 2017 20:45)  T(F): 98 (07 Sep 2017 12:00), Max: 98 (06 Sep 2017 20:45)  HR: 104 (07 Sep 2017 13:00) (98 - 119)  BP: 92/62 (07 Sep 2017 13:00) (86/62 - 121/93)  BP(mean): 68 (07 Sep 2017 13:00) (68 - 104)  ABP: --  ABP(mean): --  RR: 32 (07 Sep 2017 13:12) (18 - 35)  SpO2: 93% (07 Sep 2017 13:12) (92% - 98%)        09-06 @ 07:01  -  09-07 @ 07:00  --------------------------------------------------------  IN: 1165 mL / OUT: 1000 mL / NET: 165 mL    09-07 @ 07:01  -  09-07 @ 13:52  --------------------------------------------------------  IN: 500 mL / OUT: 500 mL / NET: 0 mL      CAPILLARY BLOOD GLUCOSE  120 (07 Sep 2017 12:00)          PHYSICAL EXAM:  General: Patient on HFNC in no apparent distress  HEENT: NCAT, PERRL  Respiratory: CTAB  Cardiovascular: s1s2, rrr  Abdomen: soft, nt, nd  Extremities: pulses 2+ all extremities  Skin: no rash  Neurological: AOx3    LINES:    HOSPITAL MEDICATIONS:  enoxaparin Injectable 120 milliGRAM(s) SubCutaneous every 12 hours    piperacillin/tazobactam IVPB. 3.375 Gram(s) IV Intermittent every 8 hours  vancomycin  IVPB 1250 milliGRAM(s) IV Intermittent every 12 hours  atovaquone Suspension 750 milliGRAM(s) Oral two times a day      insulin lispro (HumaLOG) corrective regimen sliding scale   SubCutaneous every 6 hours  dextrose Gel 1 Dose(s) Oral once PRN  dextrose 50% Injectable 12.5 Gram(s) IV Push once  dextrose 50% Injectable 25 Gram(s) IV Push once  dextrose 50% Injectable 25 Gram(s) IV Push once  glucagon  Injectable 1 milliGRAM(s) IntraMuscular once PRN  methylPREDNISolone sodium succinate Injectable 40 milliGRAM(s) IV Push every 6 hours      HYDROmorphone  Injectable 0.5 milliGRAM(s) IV Push every 4 hours PRN    calcium carbonate 500 mG (Tums) Chewable 1 Tablet(s) Chew every 6 hours PRN        dextrose 5%. 1000 milliLiter(s) IV Continuous <Continuous>  dextrose 5% 1000 milliLiter(s) IV Continuous <Continuous>        nicotine - 21 mG/24Hr(s) Patch 1 patch Transdermal daily      LABS:                        10.8   19.0  )-----------( 61       ( 07 Sep 2017 04:24 )             31.2     Hgb Trend: 10.8<--, 10.4<--, 10.4<--, 10.9<--, 11.8<--  09-07    131<L>  |  101  |  41<H>  ----------------------------<  170<H>  6.0<H>   |  11<L>  |  1.40<H>    Ca    7.1<L>      07 Sep 2017 04:24    TPro  6.6  /  Alb  3.1<L>  /  TBili  0.5  /  DBili  x   /  AST  40  /  ALT  21  /  AlkPhos  188<H>  09-07    Creatinine Trend: 1.40<--, 1.12<--, 0.91<--, 0.95<--, 1.15<--, 1.45<--  PT/INR - ( 07 Sep 2017 04:24 )   PT: 14.6 sec;   INR: 1.33 ratio         PTT - ( 07 Sep 2017 04:24 )  PTT:28.9 sec    Arterial Blood Gas:  09-07 @ 13:14  7.52/24/48/20/82/-1.9  ABG lactate: --  Arterial Blood Gas:  09-07 @ 04:27  7.42/19/61/12/89/-10.7  ABG lactate: --  Arterial Blood Gas:  09-07 @ 00:19  7.45/22/72/15/94/-7.1  ABG lactate: --  Arterial Blood Gas:  09-06 @ 21:09  7.49/17/45/13/79/-8.6  ABG lactate: --  Arterial Blood Gas:  09-05 @ 21:58  7.42/22/82/14/96/-9.3  ABG lactate: --        MICROBIOLOGY:     RADIOLOGY:  [ ] Reviewed and interpreted by me    EKG:
[facility], Division of Infectious Diseases  AUDRA Vega A. Lee    Name: STEPHY OLIVARES  Age: 57y  Gender: Male  MRN: [id number]    Interval History--  Notes reviewed. Gets SOB with minimal activity, "...and it takes a long time to recover." Does not want to eat/drink because it's a major effort to go to the bathroom with his dyspnea. Denies fevers, chills, or rigors. No CP. Dry cough. More comfortable with the high-flow O2 than NIPPV.    Vanco trough low, discussed with NP yesterday. Dose increased.    Past Medical History--  Other chronic pulmonary embolism  Non-small cell carcinoma of lung  Cancer  No significant past surgical history      For details regarding the patient's social history, family history, and other miscellaneous elements, please refer the initial infectious diseases consultation and/or the admitting history and physical examination for this admission.    Allergies    No Known Allergies    Intolerances      Medications--  Antibiotics:  piperacillin/tazobactam IVPB. 3.375 Gram(s) IV Intermittent every 8 hours  trimethoprim / sulfamethoxazole IVPB 600 milliGRAM(s) IV Intermittent every 8 hours  vancomycin  IVPB 1250 milliGRAM(s) IV Intermittent every 12 hours    Immunologic:    Other:  oxyCODONE    5 mG/acetaminophen 325 mG PRN  enoxaparin Injectable  insulin lispro (HumaLOG) corrective regimen sliding scale  dextrose 5%.  dextrose Gel PRN  dextrose 50% Injectable  dextrose 50% Injectable  dextrose 50% Injectable  glucagon  Injectable PRN  guaiFENesin    Syrup PRN  methylPREDNISolone sodium succinate Injectable      Review of Systems--  A 10-point review of systems was obtained.     Pertinent positives and negatives--  Constitutional: No fevers. No Chills. No Rigors.   Cardiovascular: No chest pain. No palpitations.  Respiratory: +shortness of breath. +cough.  Gastrointestinal: No nausea or vomiting. No diarrhea. No BM's.   Psychiatric: Pleasant. Appropriate sligtly anxious affect.    Review of systems otherwise negative except as previously noted.    Physical Examination--  Vital Signs: T(F): 98.3 (09-06-17 @ 03:58), Max: 98.6 (09-05-17 @ 11:54)  HR: 110 (09-06-17 @ 05:26)  BP: 126/76 (09-06-17 @ 05:26)  RR: 20 (09-06-17 @ 05:26)  SpO2: 94% (09-06-17 @ 05:26)  Wt(kg): --  General: Nontoxic-appearing Male in no acute distress.  HEENT: AT/NC. PERRL. EOMI. Anicteric. Conjunctiva pink and moist. Oropharynx clear. Dentition fair.  Neck: Not rigid. No sense of mass.  Nodes: None palpable.  Lungs: Crackles bilaterally without wheezing or ronchi, diminished breath sounds.  Heart: Regular rate and rhythm. No Murmur. No rub. No gallop. No palpable thrill.  Abdomen: Bowel sounds present and normoactive. Soft. Nondistended. Nontender. No sense of mass. No organomegaly.  Back: No spinal tenderness. No costovertebral angle tenderness.   Extremities: No cyanosis or clubbing. No edema.   Skin: Warm. Dry. Good turgor. No rash. No vasculitic stigmata.  Psychiatric: Appropriate affect and mood for situation.         Laboratory Studies--  CBC                        10.4   8.74  )-----------( 61       ( 05 Sep 2017 07:33 )             32.7       Chemistries  09-06    133<L>  |  100  |  33<H>  ----------------------------<  107<H>  5.2   |  13<L>  |  1.12    Ca    6.7<L>      06 Sep 2017 07:40    Blood Gas Arterial, Lactate (09.05.17 @ 21:58)    Blood Gas Arterial, Lactate: 3.0    Blood Gas Profile - Arterial (09.05.17 @ 21:58)    pH, Arterial: 7.42    pCO2, Arterial: 22 mmHg    pO2, Arterial: 82 mmHg    HCO3, Arterial: 14 mmoL/L    Base Excess, Arterial: -9.3 mmol/L    Oxygen Saturation, Arterial: 96 %    Total CO2, Arterial: 14 mmoL/L    FIO2, Arterial: 100    Blood Gas Source Arterial: Arterial    Legionella pneumophila Antigen, Urine (09.05.17 @ 04:33)    Legionella Antigen, Urine: Negative    Rapid Respiratory Viral Panel (09.05.17 @ 00:06)    Rapid RVP Result: NotDetec: The FilmArray RVP Rapid uses polymerase chain reaction (PCR) and melt  curve analysis to screen for adenovirus; coronavirus HKU1, NL63, 229E,  OC43; human metapneumovirus (hMPV); human enterovirus/rhinovirus  (Entero/RV); influenza A; influenza A/H1;NotDetec: influenza A/H3; influenza  A/H1-2009; influenza B; parainfluenza viruses 1, 2, 3, 4; respiratory  syncytial virus; Bordetella pertussis; Mycoplasma pneumoniae; and  Chlamydophila pneumoniae.      Culture Data  Culture - Blood (09.04.17 @ 06:01)    Specimen Source: .Blood Blood    Culture Results:   No growth to date.    Culture - Blood (09.04.17 @ 06:01)    Specimen Source: .Blood Blood    Culture Results:   No growth to date.
[facility]ematology/Oncology - Ian Lucero / Esau / Cristóbal - [phone]  Primary Outpatient Hematologist/Oncologist: Dr. Brien Lucero    Subjective  Patient seen and examined. Lying in bed. BIPAP. No improvement in clinical status    Admitting Complaint/History  HPI:  57 year old man followed by Dr. Shaffer and Dr. Avelar at 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  Patient now on BiPAP in the ER with improvement of symptoms.  NO hemoptysis.  NO HA, no focal weakness.  No chest pain/pressure.  No abdominal pain, no red blood per rectum or melena.   No back pain.  No tearing  back pain.  NO hematuria.  Patient with anorexia and involuntary weight loss.  Remaining review of systems not contributory. (04 Sep 2017 02:42)    ROS:  General:  (-)Pain, (+)Decrease appetite, (-)Fevers, (-)Chills, (-)Weight Loss  Eyes: (-)Blurry Vision, (-)Double Vision, (-)Vision Loss  ENT: (-)Sinus Congestion, (-)Decreased Hearing, (-)Nosebleeds, (-)Sore Throat  Cardiac: (-)Chest Pain, (-)Palpitations, (+)Shortness of breathe on exertion  Respiratory: (-)Cough, (-)hemoptysis, (+)Shortness of breathe at rest  GI: (-)Nausea, (-)Vomiting, (-)Diarrhea, (-)Constipation, (-)Melena, (-)BRBPR  : (-)Hematuria, (-)Dysuria, (-)Polyuia  MSK: (-)Back pain, (-)Joint pain, (-)Stiffness  Dermatology: (-)Rash, (-)Itching  Neurology:  (-)Numbness, (-)Tingling, (+Difficulty Walking, (-)Tremors, (+)Weakness  Psych: (-)Anxiety, (-)Depression, (-)Memory Loss  Hematology:  (-)Easy Bruising, (-)Easy Bleeding, (-)Night Sweats.     Objective  Vital Signs Last 24 Hrs  T(C): 36.5 (05 Sep 2017 04:05), Max: 37 (04 Sep 2017 15:23)  T(F): 97.7 (05 Sep 2017 04:05), Max: 98.6 (04 Sep 2017 15:23)  HR: 107 (05 Sep 2017 04:50) (94 - 120)  BP: 114/78 (05 Sep 2017 04:05) (100/64 - 115/73)  BP(mean): --  RR: 20 (05 Sep 2017 04:05) (20 - 22)  SpO2: 98% (05 Sep 2017 04:50) (94% - 100%)    Physical Exam:  General: AAO x 3. NAD. Sitting in the bed. On BIPAP. Somewhat comfortable  HEENT: clear oropharynx, anicteric sclera, pink conjunctivae, EOMi. PERRLA  Cardiac: S1, S2 present. No audible murmurs. RRR  Lungs: B/L rhonchi  Abdomen: Soft, Non-Tender, Non-Distended. No palpable hepatosplenomegaly  Extremities: 2+ pulses. No edema  Skin: No Rashes. No petechiae  Neuro: No focal motor or sensory deficits.     Medications:  MEDICATIONS  (STANDING):  enoxaparin Injectable 120 milliGRAM(s) SubCutaneous every 12 hours  piperacillin/tazobactam IVPB. 3.375 Gram(s) IV Intermittent every 8 hours  vancomycin  IVPB   IV Intermittent   insulin lispro (HumaLOG) corrective regimen sliding scale   SubCutaneous every 6 hours  dextrose 5%. 1000 milliLiter(s) (50 mL/Hr) IV Continuous <Continuous>  dextrose 50% Injectable 12.5 Gram(s) IV Push once  dextrose 50% Injectable 25 Gram(s) IV Push once  dextrose 50% Injectable 25 Gram(s) IV Push once  vancomycin  IVPB 1000 milliGRAM(s) IV Intermittent every 12 hours  methylPREDNISolone sodium succinate Injectable 40 milliGRAM(s) IV Push every 8 hours  trimethoprim / sulfamethoxazole IVPB 600 milliGRAM(s) IV Intermittent every 8 hours    MEDICATIONS  (PRN):  oxyCODONE    5 mG/acetaminophen 325 mG 2 Tablet(s) Oral every 4 hours PRN Moderate Pain (4 - 6)  dextrose Gel 1 Dose(s) Oral once PRN Blood Glucose LESS THAN 70 milliGRAM(s)/deciliter  glucagon  Injectable 1 milliGRAM(s) IntraMuscular once PRN Glucose LESS THAN 70 milligrams/deciliter      Labs:                        10.9   8.24  )-----------( 56       ( 04 Sep 2017 08:09 )             33.6     09-04    141  |  105  |  39<H>  ----------------------------<  98  4.8   |  18<L>  |  1.15    Ca    7.9<L>      04 Sep 2017 01:59    TPro  7.4  /  Alb  3.9  /  TBili  1.3<H>  /  DBili  x   /  AST  36  /  ALT  27  /  AlkPhos  222<H>  09-03    PT/INR - ( 04 Sep 2017 08:09 )   PT: 13.5 sec;   INR: 1.19 ratio   PTT - ( 04 Sep 2017 08:09 )  PTT:29.1 sec    Radiology:  CT Chest 9/4/2017  IMPRESSION:     Suboptimal evaluation of the peripheral pulmonary arteries. Overall   decreased extent of previously noted emboli with residual segmental   pulmonary emboli as described. No central pulmonary embolus.    Persistent right hilar/right upper lobe mass, which is similar in   appearance to 12/19/2016 and is in keeping with patient's known   malignancy.    Significantly increased bilateral pulmonary groundglass opacities, which   may represent pneumonia although additional metastasis cannot be   excluded. Recommend follow-up to assess resolution following completion   of treatment.    Increased extent of osseous metastasis since 12/19/2016.
[facility], Division of Infectious Diseases  AUDRA Vega A. Lee    Name: STEPHY OLIVARES  Age: 57y  Gender: Male  MRN: [id number]    Interval History--  Notes reviewed. Seen earlier today. Transferred to MICU for hypoxemic respiratory failure. Transitioning between high-flow O2 and NIPPV. No complaints other than dyspnea.  Antibiotics altered independently by MICU.    Past Medical History--  Other chronic pulmonary embolism  Non-small cell carcinoma of lung  Cancer  No significant past surgical history      For details regarding the patient's social history, family history, and other miscellaneous elements, please refer the initial infectious diseases consultation and/or the admitting history and physical examination for this admission.    Allergies    No Known Allergies    Intolerances      Medications--  Antibiotics:  piperacillin/tazobactam IVPB. 3.375 Gram(s) IV Intermittent every 8 hours  vancomycin  IVPB 1250 milliGRAM(s) IV Intermittent every 12 hours  atovaquone Suspension 750 milliGRAM(s) Oral two times a day    Immunologic:    Other:  enoxaparin Injectable  nicotine - 21 mG/24Hr(s) Patch  insulin lispro (HumaLOG) corrective regimen sliding scale  dextrose 5%.  dextrose Gel PRN  dextrose 50% Injectable  dextrose 50% Injectable  dextrose 50% Injectable  glucagon  Injectable PRN  dextrose 5%  methylPREDNISolone sodium succinate Injectable  HYDROmorphone  Injectable PRN  calcium carbonate 500 mG (Tums) Chewable PRN      Review of Systems--  Review of systems limited secondary to clinical status.    Physical Examination--  Vital Signs: T(F): 98 (09-07-17 @ 07:29), Max: 98 (09-06-17 @ 12:24)  HR: 112 (09-07-17 @ 11:00)  BP: 93/63 (09-07-17 @ 11:00)  RR: 27 (09-07-17 @ 11:00)  SpO2: 92% (09-07-17 @ 11:00)  Wt(kg): --  General: Nontoxic-appearing Male in no acute distress.  HEENT: AT/NC. PERRL. EOMI. Anicteric. Conjunctiva pink and moist. Oropharynx clear. Dentition fair.  Neck: Not rigid. No sense of mass.  Nodes: None palpable.  Lungs: Crackles bilaterally without wheezing or ronchi, diminished breath sounds.  Heart: Regular rate and rhythm. No Murmur. No rub. No gallop. No palpable thrill.  Abdomen: Bowel sounds present and normoactive. Soft. Nondistended. Nontender. No sense of mass. No organomegaly.  Back: No spinal tenderness. No costovertebral angle tenderness.   Extremities: No cyanosis or clubbing. No edema.   Skin: Warm. Dry. Good turgor. No rash. No vasculitic stigmata.  Psychiatric: Appropriate affect and mood for situation.     Laboratory Studies--  CBC                        10.8   19.0  )-----------( 61       ( 07 Sep 2017 04:24 )             31.2       Chemistries  09-07    131<L>  |  101  |  41<H>  ----------------------------<  170<H>  6.0<H>   |  11<L>  |  1.40<H>    Ca    7.1<L>      07 Sep 2017 04:24    TPro  6.6  /  Alb  3.1<L>  /  TBili  0.5  /  DBili  x   /  AST  40  /  ALT  21  /  AlkPhos  188<H>  09-07    CXR, personally reviewed. No official report. Similar to prior study, some slight L sided improvement.    Culture Data  Culture - Blood (09.04.17 @ 06:01)    Specimen Source: .Blood Blood    Culture Results:   No growth to date.    Culture - Blood (09.04.17 @ 06:01)    Specimen Source: .Blood Blood    Culture Results:   No growth to date.
Patient is a 57y old  Male who presents with a chief complaint of RODRIGUEZ (04 Sep 2017 08:54)      SUBJECTIVE / OVERNIGHT EVENTS:    Did not tolerate BiPaP too well last night, but feels much more comfortable with high-flow nasal cannula O2.  Dry cough      Vital Signs Last 24 Hrs  T(C): 37 (05 Sep 2017 11:54), Max: 37 (04 Sep 2017 15:23)  T(F): 98.6 (05 Sep 2017 11:54), Max: 98.6 (04 Sep 2017 15:23)  HR: 100 (05 Sep 2017 11:54) (99 - 120)  BP: 104/70 (05 Sep 2017 11:54) (100/64 - 115/73)  BP(mean): --  RR: 18 (05 Sep 2017 11:54) (18 - 22)  SpO2: 96% (05 Sep 2017 11:54) (94% - 100%)  I&O's Summary    04 Sep 2017 07:01  -  05 Sep 2017 07:00  --------------------------------------------------------  IN: 1320 mL / OUT: 1500 mL / NET: -180 mL    05 Sep 2017 07:01  -  05 Sep 2017 12:19  --------------------------------------------------------  IN: 100 mL / OUT: 400 mL / NET: -300 mL        GENERAL: NAD, AAOx3  HEAD:  Atraumatic, Normocephalic  EYES: EOMI  NECK: Supple, No JVD, No LAD  CHEST/LUNG: scattered upper-mid rhonchi; no rales; didn't hear that much wheezing  HEART: Regular rate and rhythm; No murmurs, rubs, or gallops  ABDOMEN: Soft, Nontender, Nondistended; Bowel sounds present  EXTREMITIES:  2+ Peripheral Pulses,2+ b/l LE edema  SKIN: Multiple ecchymoses lower abd area 2' lovenox  NEURO: nonfocal CN/motor/sensory/reflexes    LABS:                        10.4   8.74  )-----------( 61       ( 05 Sep 2017 07:33 )             32.7     09-05    139  |  106  |  32<H>  ----------------------------<  136<H>  5.8<H>   |  19<L>  |  0.95    Ca    7.6<L>      05 Sep 2017 07:21    TPro  7.4  /  Alb  3.9  /  TBili  1.3<H>  /  DBili  x   /  AST  36  /  ALT  27  /  AlkPhos  222<H>  09-03    PT/INR - ( 05 Sep 2017 07:33 )   PT: 13.6 sec;   INR: 1.20 ratio         PTT - ( 05 Sep 2017 07:33 )  PTT:28.5 sec  CAPILLARY BLOOD GLUCOSE  116 (05 Sep 2017 11:54)  137 (05 Sep 2017 05:33)  141 (04 Sep 2017 23:57)  131 (04 Sep 2017 17:25)  122 (04 Sep 2017 13:52)        CARDIAC MARKERS ( 04 Sep 2017 09:14 )  x     / <0.01 ng/mL / 122 U/L / x     / 2.6 ng/mL  CARDIAC MARKERS ( 04 Sep 2017 01:59 )  x     / <0.01 ng/mL / 136 U/L / x     / 2.9 ng/mL  CARDIAC MARKERS ( 03 Sep 2017 19:24 )  x     / 0.01 ng/mL / 150 U/L / x     / 3.0 ng/mL      Urinalysis Basic - ( 04 Sep 2017 09:49 )    Color: Yellow / Appearance: Clear / SG: =1.051 / pH: x  Gluc: x / Ketone: Negative  / Bili: Negative / Urobili: Negative   Blood: x / Protein: 100 / Nitrite: Negative   Leuk Esterase: Negative / RBC: 4 /HPF / WBC 5 /HPF   Sq Epi: x / Non Sq Epi: 3 /HPF / Bacteria: Negative        RADIOLOGY & ADDITIONAL TESTS:    Imaging Personally Reviewed:  [x] YES  [ ] NO    Consultant(s) Notes Reviewed:  [x] YES  [ ] NO      MEDICATIONS  (STANDING):  enoxaparin Injectable 120 milliGRAM(s) SubCutaneous every 12 hours  piperacillin/tazobactam IVPB. 3.375 Gram(s) IV Intermittent every 8 hours  vancomycin  IVPB   IV Intermittent   insulin lispro (HumaLOG) corrective regimen sliding scale   SubCutaneous every 6 hours  dextrose 5%. 1000 milliLiter(s) (50 mL/Hr) IV Continuous <Continuous>  dextrose 50% Injectable 12.5 Gram(s) IV Push once  dextrose 50% Injectable 25 Gram(s) IV Push once  dextrose 50% Injectable 25 Gram(s) IV Push once  vancomycin  IVPB 1000 milliGRAM(s) IV Intermittent every 12 hours  trimethoprim / sulfamethoxazole IVPB 600 milliGRAM(s) IV Intermittent every 8 hours  methylPREDNISolone sodium succinate Injectable 40 milliGRAM(s) IV Push every 6 hours    MEDICATIONS  (PRN):  oxyCODONE    5 mG/acetaminophen 325 mG 2 Tablet(s) Oral every 4 hours PRN Moderate Pain (4 - 6)  dextrose Gel 1 Dose(s) Oral once PRN Blood Glucose LESS THAN 70 milliGRAM(s)/deciliter  glucagon  Injectable 1 milliGRAM(s) IntraMuscular once PRN Glucose LESS THAN 70 milligrams/deciliter  guaiFENesin    Syrup 200 milliGRAM(s) Oral every 6 hours PRN Cough      Care Discussed with Consultants/Other Providers [x] YES  [ ] NO    HEALTH ISSUES - PROBLEM Dx:  Thrombocytopenia: Thrombocytopenia  Other chronic pulmonary embolism: Other chronic pulmonary embolism  Non-small cell carcinoma of lung: Non-small cell carcinoma of lung  Acute respiratory failure with hypoxemia: Acute respiratory failure with hypoxemia
Still SOB; remained on high-flow NC through the night    Vital Signs Last 24 Hrs  T(C): 36.8 (06 Sep 2017 03:58), Max: 36.8 (06 Sep 2017 03:58)  T(F): 98.3 (06 Sep 2017 03:58), Max: 98.3 (06 Sep 2017 03:58)  HR: 88 (06 Sep 2017 08:14) (88 - 130)  BP: 118/77 (06 Sep 2017 10:50) (102/78 - 135/94)  BP(mean): --  RR: 20 (06 Sep 2017 05:26) (18 - 25)  SpO2: 90% (06 Sep 2017 10:50) (90% - 96%)    GENERAL: mild respiratory distress, AAOx3  HEAD:  Atraumatic, Normocephalic  EYES: EOMI  NECK: Supple, No JVD, No LAD  CHEST/LUNG: scattered upper-mid rhonchi; no rales; didn't hear that much wheezing  HEART: Regular rate and rhythm; No murmurs, rubs, or gallops  ABDOMEN: Soft, Nontender, Nondistended; Bowel sounds present  EXTREMITIES:  2+ Peripheral Pulses,2+ b/l LE edema  SKIN: Multiple ecchymoses lower abd area 2' lovenox  NEURO: nonfocal CN/motor/sensory/reflexes    LABS:                        10.4   13.42 )-----------( 59       ( 06 Sep 2017 07:33 )             31.1     09-06    133<L>  |  100  |  33<H>  ----------------------------<  107<H>  5.2   |  13<L>  |  1.12    Ca    6.7<L>      06 Sep 2017 07:40      PT/INR - ( 05 Sep 2017 07:33 )   PT: 13.6 sec;   INR: 1.20 ratio         PTT - ( 05 Sep 2017 07:33 )  PTT:28.5 sec  CAPILLARY BLOOD GLUCOSE  146 (06 Sep 2017 11:16)  63 (06 Sep 2017 11:11)  138 (06 Sep 2017 06:10)  231 (05 Sep 2017 20:58)  119 (05 Sep 2017 16:44)

## 2017-09-07 NOTE — PROGRESS NOTE ADULT - PROBLEM SELECTOR PROBLEM 2
Non-small cell carcinoma of lung

## 2017-09-07 NOTE — PROGRESS NOTE ADULT - ASSESSMENT
Acute respiratory failure  RVP negative  Legionella negative  Blood Cx NTD  ABG with chronic respiratory alkalosis  Lactate on ABG elevates as well.  R/O PCP, fungitell pending.  R/O Drug toxicity    Bactrim discontinued in the setting of renal insufficiency and hyperkalemia; and changed to atovaquone. While the latter is relatively nontoxic if this truly is PCP would probably alter Rx to Clindamycin plus primaquine, which has a longer track record in severe disease. However without more proof of PCP here I have no objection to continuing atovaquone presently. I do not think that pentamidine has a favorable risk:benefit profile both in general and in this case in particular.

## 2017-09-08 LAB
EOSINOPHIL NFR URNS MANUAL: NEGATIVE — SIGNIFICANT CHANGE UP
FUNGITELL: 189 PG/ML — HIGH (ref 0–59)

## 2017-09-08 PROCEDURE — 87486 CHLMYD PNEUM DNA AMP PROBE: CPT

## 2017-09-08 PROCEDURE — 94660 CPAP INITIATION&MGMT: CPT

## 2017-09-08 PROCEDURE — 80053 COMPREHEN METABOLIC PANEL: CPT

## 2017-09-08 PROCEDURE — 84100 ASSAY OF PHOSPHORUS: CPT

## 2017-09-08 PROCEDURE — 80202 ASSAY OF VANCOMYCIN: CPT

## 2017-09-08 PROCEDURE — 85610 PROTHROMBIN TIME: CPT

## 2017-09-08 PROCEDURE — 84540 ASSAY OF URINE/UREA-N: CPT

## 2017-09-08 PROCEDURE — 87040 BLOOD CULTURE FOR BACTERIA: CPT

## 2017-09-08 PROCEDURE — 97161 PT EVAL LOW COMPLEX 20 MIN: CPT

## 2017-09-08 PROCEDURE — 99291 CRITICAL CARE FIRST HOUR: CPT | Mod: 25

## 2017-09-08 PROCEDURE — 85027 COMPLETE CBC AUTOMATED: CPT

## 2017-09-08 PROCEDURE — 83605 ASSAY OF LACTIC ACID: CPT

## 2017-09-08 PROCEDURE — 82803 BLOOD GASES ANY COMBINATION: CPT

## 2017-09-08 PROCEDURE — 82553 CREATINE MB FRACTION: CPT

## 2017-09-08 PROCEDURE — 87205 SMEAR GRAM STAIN: CPT

## 2017-09-08 PROCEDURE — 81001 URINALYSIS AUTO W/SCOPE: CPT

## 2017-09-08 PROCEDURE — 36600 WITHDRAWAL OF ARTERIAL BLOOD: CPT

## 2017-09-08 PROCEDURE — 80048 BASIC METABOLIC PNL TOTAL CA: CPT

## 2017-09-08 PROCEDURE — 94640 AIRWAY INHALATION TREATMENT: CPT

## 2017-09-08 PROCEDURE — 85014 HEMATOCRIT: CPT

## 2017-09-08 PROCEDURE — 82570 ASSAY OF URINE CREATININE: CPT

## 2017-09-08 PROCEDURE — 82550 ASSAY OF CK (CPK): CPT

## 2017-09-08 PROCEDURE — 93005 ELECTROCARDIOGRAM TRACING: CPT

## 2017-09-08 PROCEDURE — 87633 RESP VIRUS 12-25 TARGETS: CPT

## 2017-09-08 PROCEDURE — 71045 X-RAY EXAM CHEST 1 VIEW: CPT

## 2017-09-08 PROCEDURE — 71275 CT ANGIOGRAPHY CHEST: CPT

## 2017-09-08 PROCEDURE — 87449 NOS EACH ORGANISM AG IA: CPT

## 2017-09-08 PROCEDURE — 83880 ASSAY OF NATRIURETIC PEPTIDE: CPT

## 2017-09-08 PROCEDURE — 92950 HEART/LUNG RESUSCITATION CPR: CPT

## 2017-09-08 PROCEDURE — 85730 THROMBOPLASTIN TIME PARTIAL: CPT

## 2017-09-08 PROCEDURE — 87581 M.PNEUMON DNA AMP PROBE: CPT

## 2017-09-08 PROCEDURE — 83735 ASSAY OF MAGNESIUM: CPT

## 2017-09-08 PROCEDURE — 84484 ASSAY OF TROPONIN QUANT: CPT

## 2017-09-08 PROCEDURE — 82435 ASSAY OF BLOOD CHLORIDE: CPT

## 2017-09-08 PROCEDURE — 82009 KETONE BODYS QUAL: CPT

## 2017-09-08 PROCEDURE — 82330 ASSAY OF CALCIUM: CPT

## 2017-09-08 PROCEDURE — 84132 ASSAY OF SERUM POTASSIUM: CPT

## 2017-09-08 PROCEDURE — 84295 ASSAY OF SERUM SODIUM: CPT

## 2017-09-08 PROCEDURE — 82947 ASSAY GLUCOSE BLOOD QUANT: CPT

## 2017-09-08 PROCEDURE — 87798 DETECT AGENT NOS DNA AMP: CPT

## 2017-09-08 NOTE — DISCHARGE NOTE FOR THE EXPIRED PATIENT - HOSPITAL COURSE
HPI:  57 year old man with metastatic NSCLC adenocarcinoma w/ bone mets s/p chemotherapy, now on immunotherapy-Keytruda (diagnosed in Nov.), heavy smoker, pulmonary emboli (failed on pradaxa) now on lovenox, initially presented w/ SOB admitted w/ hypoxic respiratory failure. Patient followed by pulmonary, ID, and oncology. Hospital course complicated by worsening hypoxic respiratory failure, requiring hi-flow, and most recently BIPAP.  Vent settings at that time: FIO2 100%, 10/, Rate 14. An RRT was called  for oxygenation desaturation. During the rapid, patient received 40IV lasix, and vent settings were changed to FIO2 100%, 12/7, Rate 14.     MICU was called s/p RRT for tachypnea on BIPAP.    Patient on vent settings, was breathing at rate of 30s, w/ tidal volumes of 1200, saturating 89-93%. Patient did not complain of respiratory weakness. Did complain of anxiety and SOB. No complaints of CP, palpitations, fevers, chills, N/V, abdominal pain, urinary symptoms. All other ROS is negative.    Pertinent Labs:  WBC 19.0, Hgb/Hct 10.8/31.2, platelets 61, Na 131, K+6.0, Chloride 101, HCO3 11, BUN 41, Cr 1.40, Ca 7.8, Ce x 1 negative  pH 7.42 [id number], PO2 61, O2 sat 89% on vent settings  FIO2 100%, /, Rate 14  Hospital Course: Pt was admitted with to MICU with tachypnea. H    MICU Course:   Pt was admitted to MICu due to hypocix respiratory failure secondary to chemo toxicity vs PCP PNA, as evidenced by CTA with persistent Right hilar/ RUL mass increased ground glass opacities. The patient arrived tachypnic and tachycardic. The patient was continued on BIPAP ventilatory support. RVP and legionella were found to be negative. The patient was continued on vancomycin and zosyn. Mepron was started. The patient was found to be in acute renal failure. A bicarb drip was started. Morphine 1 mg was to given. On the evening on , the patient became unresponsive without a pulse and CPR was started. The family was notified at the beginning of the episode and advised the patient did not have DNR/DNI. The patient remained pulseless after multiple rounds of CPR and epinephrine. The patient was found to be without pupillary response, without spontaneous respirations and pulseless and pronounced  at 1148PM.   Cause of death: Acute hypoxic respiratory failure secondary to Stage IV Non-Small Cell Lung Adenocarcinoma. HPI:  57 year old man with metastatic NSCLC adenocarcinoma w/ bone mets s/p chemotherapy, now on immunotherapy-Keytruda (diagnosed in Nov.), heavy smoker, pulmonary emboli (failed on pradaxa) now on lovenox, initially presented w/ SOB admitted w/ hypoxic respiratory failure. Patient followed by pulmonary, ID, and oncology. Hospital course complicated by worsening hypoxic respiratory failure, requiring hi-flow, and most recently BIPAP.  Vent settings at that time: FIO2 100%, 10/, Rate 14. An RRT was called  for oxygenation desaturation. During the rapid, patient received 40IV lasix, and vent settings were changed to FIO2 100%, 12/7, Rate 14.     MICU was called s/p RRT for tachypnea on BIPAP.    Patient on vent settings, was breathing at rate of 30s, w/ tidal volumes of 1200, saturating 89-93%. Patient did not complain of respiratory weakness. Did complain of anxiety and SOB. No complaints of CP, palpitations, fevers, chills, N/V, abdominal pain, urinary symptoms. All other ROS is negative.    Pertinent Labs:  WBC 19.0, Hgb/Hct 10.8/31.2, platelets 61, Na 131, K+6.0, Chloride 101, HCO3 11, BUN 41, Cr 1.40, Ca 7.8, Ce x 1 negative  pH 7.42 [id number], PO2 61, O2 sat 89% on vent settings  FIO2 100%, /, Rate 14  Hospital Course: Pt was admitted with to MICU with tachypnea. H    MICU Course:   Pt was admitted to MICU due to hypoxic respiratory failure secondary to chemo toxicity vs PCP PNA, as evidenced by CTA with persistent Right hilar/ RUL mass and increased ground glass opacities. The patient arrived tachypnic and tachycardic. The patient was continued on BIPAP ventilatory support. RVP and legionella were found to be negative. The patient was continued on vancomycin and zosyn. Mepron was started. The patient was found to be in acute renal failure. A bicarb drip was started. Morphine 1 mg was to given. On the evening on , the patient became unresponsive without a pulse and CPR was started. The family was notified at the beginning of the episode and advised the patient did not have DNR/DNI. The patient remained pulseless after multiple rounds of CPR and epinephrine. The patient was found to be without pupillary response, without spontaneous respirations and pulseless and pronounced  at 1148PM.   Cause of death: Acute hypoxic respiratory failure secondary to Stage IV Non-Small Cell Lung Adenocarcinoma.

## 2017-09-08 NOTE — CHART NOTE - NSCHARTNOTEFT_GEN_A_CORE
MICU Attending:  Called to bedside for patient unresponsiveness on BIPAP.  Patient was not responsive to voice or touch.  Agonal breathing.  While preparing for emergent intubation patient started to become bradycardic and hypotensive.  Epinephrine injected.  Vocal cords visualized with #4 Glidescope blade and ETT easily passed to 25cm at lip.  Tube confirmed with chest rise, tube frost, and breath sounds. Patient's heart rate continued to fall and PEA ensued.  An IO catheter was placed under my supervision in the L tibia and secured for medication administration.  High quality CPR and medical resuscitation was provided for 14 minutes with eventual return of pulse, requiring both Levophed and epinephrine gtt to achieve BP.  Patient then quickly lost his pulse again and a second attempt at CPR was made.  We were unable to achieve sustainable ROSC and declared patient dead at 11:48pm.  Family notified.

## 2017-09-09 LAB
CULTURE RESULTS: SIGNIFICANT CHANGE UP
CULTURE RESULTS: SIGNIFICANT CHANGE UP
SPECIMEN SOURCE: SIGNIFICANT CHANGE UP
SPECIMEN SOURCE: SIGNIFICANT CHANGE UP

## 2017-11-20 RX ORDER — DEXAMETHASONE 0.5 MG/5ML
4 ELIXIR ORAL
Qty: 0 | Refills: 0 | COMMUNITY

## 2019-06-14 NOTE — PATIENT PROFILE ADULT. - PURPOSEFUL PROACTIVE ROUNDING
Patient [Follow-Up] : a follow-up visit [Formal Caregiver] : formal caregiver [Intercurrent Specialty/Sub-specialty Visits] : the patient has intercurrent specialty/sub-specialty visits [FreeTextEntry1] : weakness [FreeTextEntry3] : optho

## 2023-05-08 NOTE — H&P ADULT - NSHPREVIEWOFSYSTEMS_GEN_ALL_CORE
SEE ABOVE.
You can access the FollowMyHealth Patient Portal offered by [facility] by registering at the following website: [email or web address]. By joining [facility]’s FollowMyHealth portal, you will also be able to view your health information using other applications (apps) compatible with our system.

## 2023-08-01 NOTE — CONSULT NOTE ADULT - PROVIDER SPECIALTY LIST ADULT
Conjuntivae and eyelids appear normal,  Sclerae : White without injection
Infectious Disease
[facility]
Pulmonology